# Patient Record
Sex: FEMALE | URBAN - METROPOLITAN AREA
[De-identification: names, ages, dates, MRNs, and addresses within clinical notes are randomized per-mention and may not be internally consistent; named-entity substitution may affect disease eponyms.]

---

## 2017-02-15 ENCOUNTER — OUTPATIENT (OUTPATIENT)
Dept: OUTPATIENT SERVICES | Facility: HOSPITAL | Age: 62
LOS: 1 days | Discharge: HOME | End: 2017-02-15
Payer: MEDICARE

## 2017-02-15 ENCOUNTER — RESULT REVIEW (OUTPATIENT)
Age: 62
End: 2017-02-15

## 2017-02-15 VITALS
OXYGEN SATURATION: 100 % | RESPIRATION RATE: 16 BRPM | SYSTOLIC BLOOD PRESSURE: 114 MMHG | TEMPERATURE: 98 F | HEIGHT: 63 IN | HEART RATE: 76 BPM | DIASTOLIC BLOOD PRESSURE: 76 MMHG | WEIGHT: 138.01 LBS

## 2017-02-15 VITALS
DIASTOLIC BLOOD PRESSURE: 84 MMHG | RESPIRATION RATE: 16 BRPM | OXYGEN SATURATION: 99 % | TEMPERATURE: 99 F | SYSTOLIC BLOOD PRESSURE: 139 MMHG | HEART RATE: 75 BPM

## 2017-02-15 DIAGNOSIS — F41.9 ANXIETY DISORDER, UNSPECIFIED: ICD-10-CM

## 2017-02-15 DIAGNOSIS — D47.2 MONOCLONAL GAMMOPATHY: ICD-10-CM

## 2017-02-15 DIAGNOSIS — R80.9 PROTEINURIA, UNSPECIFIED: ICD-10-CM

## 2017-02-15 DIAGNOSIS — I10 ESSENTIAL (PRIMARY) HYPERTENSION: ICD-10-CM

## 2017-02-15 DIAGNOSIS — Z98.89 OTHER SPECIFIED POSTPROCEDURAL STATES: Chronic | ICD-10-CM

## 2017-02-15 DIAGNOSIS — N99.840 POSTPROCEDURAL HEMATOMA OF A GENITOURINARY SYSTEM ORGAN OR STRUCTURE FOLLOWING A GENITOURINARY SYSTEM PROCEDURE: ICD-10-CM

## 2017-02-15 DIAGNOSIS — Z98.890 OTHER SPECIFIED POSTPROCEDURAL STATES: Chronic | ICD-10-CM

## 2017-02-15 DIAGNOSIS — B74.3 LOIASIS: Chronic | ICD-10-CM

## 2017-02-15 DIAGNOSIS — Z90.49 ACQUIRED ABSENCE OF OTHER SPECIFIED PARTS OF DIGESTIVE TRACT: Chronic | ICD-10-CM

## 2017-02-15 DIAGNOSIS — K63.2 FISTULA OF INTESTINE: Chronic | ICD-10-CM

## 2017-02-15 LAB
INR BLD: 0.93 RATIO — SIGNIFICANT CHANGE UP (ref 0.88–1.16)
PROTHROM AB SERPL-ACNC: 10.2 SEC — SIGNIFICANT CHANGE UP (ref 10–13.1)

## 2017-02-15 PROCEDURE — 88313 SPECIAL STAINS GROUP 2: CPT | Mod: 26

## 2017-02-15 PROCEDURE — 88329 PATH CONSLTJ DRG SURG: CPT

## 2017-02-15 PROCEDURE — 88348 ELECTRON MICROSCOPY DX: CPT | Mod: 26

## 2017-02-15 PROCEDURE — 93010 ELECTROCARDIOGRAM REPORT: CPT

## 2017-02-15 PROCEDURE — 50200 RENAL BIOPSY PERQ: CPT | Mod: LT

## 2017-02-15 PROCEDURE — 77012 CT SCAN FOR NEEDLE BIOPSY: CPT | Mod: 26

## 2017-02-15 PROCEDURE — 88350 IMFLUOR EA ADDL 1ANTB STN PX: CPT | Mod: 26

## 2017-02-15 PROCEDURE — 88305 TISSUE EXAM BY PATHOLOGIST: CPT | Mod: 26

## 2017-02-15 PROCEDURE — 88346 IMFLUOR 1ST 1ANTB STAIN PX: CPT | Mod: 26

## 2017-02-15 RX ORDER — HYDROMORPHONE HYDROCHLORIDE 2 MG/ML
1 INJECTION INTRAMUSCULAR; INTRAVENOUS; SUBCUTANEOUS EVERY 4 HOURS
Qty: 0 | Refills: 0 | Status: DISCONTINUED | OUTPATIENT
Start: 2017-02-15 | End: 2017-02-15

## 2017-02-15 RX ORDER — SODIUM CHLORIDE 9 MG/ML
1000 INJECTION INTRAMUSCULAR; INTRAVENOUS; SUBCUTANEOUS
Qty: 0 | Refills: 0 | Status: DISCONTINUED | OUTPATIENT
Start: 2017-02-15 | End: 2017-02-15

## 2017-02-15 RX ORDER — FENTANYL CITRATE 50 UG/ML
25 INJECTION INTRAVENOUS
Qty: 0 | Refills: 0 | Status: DISCONTINUED | OUTPATIENT
Start: 2017-02-15 | End: 2017-02-15

## 2017-02-15 RX ADMIN — SODIUM CHLORIDE 75 MILLILITER(S): 9 INJECTION INTRAMUSCULAR; INTRAVENOUS; SUBCUTANEOUS at 16:09

## 2017-02-15 RX ADMIN — FENTANYL CITRATE 25 MICROGRAM(S): 50 INJECTION INTRAVENOUS at 16:00

## 2017-02-15 RX ADMIN — FENTANYL CITRATE 25 MICROGRAM(S): 50 INJECTION INTRAVENOUS at 15:30

## 2017-09-15 LAB — SURGICAL PATHOLOGY FINAL REPORT - CH: SIGNIFICANT CHANGE UP

## 2017-10-24 ENCOUNTER — RESULT REVIEW (OUTPATIENT)
Age: 62
End: 2017-10-24

## 2018-11-01 ENCOUNTER — RESULT REVIEW (OUTPATIENT)
Age: 63
End: 2018-11-01

## 2019-05-07 ENCOUNTER — OTHER (OUTPATIENT)
Age: 64
End: 2019-05-07

## 2019-05-07 PROBLEM — D47.2 MONOCLONAL GAMMOPATHY: Chronic | Status: ACTIVE | Noted: 2017-02-15

## 2019-05-07 PROBLEM — K65.9 PERITONITIS, UNSPECIFIED: Chronic | Status: ACTIVE | Noted: 2017-02-15

## 2019-05-07 PROBLEM — I10 ESSENTIAL (PRIMARY) HYPERTENSION: Chronic | Status: ACTIVE | Noted: 2017-02-15

## 2019-05-16 ENCOUNTER — RECORD ABSTRACTING (OUTPATIENT)
Age: 64
End: 2019-05-16

## 2019-05-16 DIAGNOSIS — N95.2 POSTMENOPAUSAL ATROPHIC VAGINITIS: ICD-10-CM

## 2019-05-16 DIAGNOSIS — Z87.898 PERSONAL HISTORY OF OTHER SPECIFIED CONDITIONS: ICD-10-CM

## 2019-05-16 DIAGNOSIS — Z92.89 PERSONAL HISTORY OF OTHER MEDICAL TREATMENT: ICD-10-CM

## 2019-05-16 LAB — CYTOLOGY CVX/VAG DOC THIN PREP: NORMAL

## 2019-05-17 ENCOUNTER — APPOINTMENT (OUTPATIENT)
Dept: OBGYN | Facility: CLINIC | Age: 64
End: 2019-05-17
Payer: MEDICARE

## 2019-05-17 VITALS
WEIGHT: 161 LBS | HEIGHT: 63 IN | BODY MASS INDEX: 28.53 KG/M2 | SYSTOLIC BLOOD PRESSURE: 130 MMHG | DIASTOLIC BLOOD PRESSURE: 80 MMHG

## 2019-05-17 PROCEDURE — 76856 US EXAM PELVIC COMPLETE: CPT | Mod: 59

## 2019-05-17 PROCEDURE — 76830 TRANSVAGINAL US NON-OB: CPT

## 2019-05-17 NOTE — HISTORY OF PRESENT ILLNESS
[Healthy Diet] : a healthy diet [Good] : being in good health [Last Mammogram ___] : Last Mammogram was [unfilled] [Last Pap ___] : Last cervical pap smear was [unfilled] [Last Bone Density ___] : Last bone density studies [unfilled] [Definite:  ___ (Date)] : the last menstrual period was [unfilled] [Menarche Age: ____] : age at menarche was [unfilled] [Sexually Active] : is not sexually active [Contraception] : does not use contraception

## 2019-05-17 NOTE — REVIEW OF SYSTEMS
[Anxiety] : anxiety [Sleep Disturbances] : sleep disturbances [Depression] : depression [Nl] : Integumentary

## 2019-06-20 ENCOUNTER — ASOB RESULT (OUTPATIENT)
Age: 64
End: 2019-06-20

## 2019-06-20 ENCOUNTER — APPOINTMENT (OUTPATIENT)
Dept: OBGYN | Facility: CLINIC | Age: 64
End: 2019-06-20
Payer: MEDICARE

## 2019-06-20 VITALS
BODY MASS INDEX: 28.7 KG/M2 | DIASTOLIC BLOOD PRESSURE: 78 MMHG | WEIGHT: 162 LBS | SYSTOLIC BLOOD PRESSURE: 112 MMHG | HEIGHT: 63 IN

## 2019-06-20 DIAGNOSIS — Z82.49 FAMILY HISTORY OF ISCHEMIC HEART DISEASE AND OTHER DISEASES OF THE CIRCULATORY SYSTEM: ICD-10-CM

## 2019-06-20 DIAGNOSIS — N88.2 STRICTURE AND STENOSIS OF CERVIX UTERI: ICD-10-CM

## 2019-06-20 PROCEDURE — 76856 US EXAM PELVIC COMPLETE: CPT | Mod: 59

## 2019-06-20 PROCEDURE — 76830 TRANSVAGINAL US NON-OB: CPT

## 2019-06-20 PROCEDURE — 99213 OFFICE O/P EST LOW 20 MIN: CPT | Mod: 25

## 2019-06-20 PROCEDURE — 36415 COLL VENOUS BLD VENIPUNCTURE: CPT

## 2019-07-03 PROBLEM — Z82.49 FAMILY HISTORY OF HYPERTENSION: Status: ACTIVE | Noted: 2019-05-16

## 2019-07-03 PROBLEM — N88.2 CERVICAL STENOSIS (UTERINE CERVIX): Status: ACTIVE | Noted: 2019-05-16

## 2019-07-03 LAB
AFP-TM SERPL-MCNC: 7.5 NG/ML
CA 125 (LABCORP): 9.3 U/ML
CANCER AG125 SERPL-ACNC: 10 U/ML
CEA SERPL-MCNC: 1.8 NG/ML
HCG SERPL-MCNC: 5 MIU/ML
HCG-TM SERPL-MCNC: 5 MIU/ML
HE 4: 70.1 PMOL/L
POSTMENOPAUSAL ROMA: 1.15
PREMENOPAUSAL ROMA: 1.49
ROMA COMMENT: NORMAL

## 2019-07-03 NOTE — HISTORY OF PRESENT ILLNESS
[Last Mammogram ___] : Last Mammogram was [unfilled] [Last Bone Density ___] : Last bone density studies [unfilled] [Last Pap ___] : Last cervical pap smear was [unfilled] [Postmenopausal] : is postmenopausal [Definite:  ___ (Date)] : the last menstrual period was [unfilled] [Menarche Age: ____] : age at menarche was [unfilled] [Sexually Active] : is not sexually active

## 2019-07-03 NOTE — PHYSICAL EXAM
[Awake] : awake [Alert] : alert [Acute Distress] : no acute distress [Mass] : no breast mass [Soft] : soft [Nipple Discharge] : no nipple discharge [Axillary LAD] : no axillary lymphadenopathy [Tender] : non tender [Oriented x3] : oriented to person, place, and time [Normal] : cervix [Uterine Adnexae] : were not tender and not enlarged [No Bleeding] : there was no active vaginal bleeding [CTAB] : CTAB [No Tenderness] : no rectal tenderness [RRR, No Murmurs] : RRR, no murmurs

## 2019-11-06 ENCOUNTER — APPOINTMENT (OUTPATIENT)
Dept: OBGYN | Facility: CLINIC | Age: 64
End: 2019-11-06
Payer: MEDICARE

## 2019-11-06 ENCOUNTER — ASOB RESULT (OUTPATIENT)
Age: 64
End: 2019-11-06

## 2019-11-06 PROCEDURE — 76830 TRANSVAGINAL US NON-OB: CPT

## 2019-11-06 PROCEDURE — 76856 US EXAM PELVIC COMPLETE: CPT | Mod: 59

## 2019-11-26 ENCOUNTER — APPOINTMENT (OUTPATIENT)
Dept: OBGYN | Facility: CLINIC | Age: 64
End: 2019-11-26
Payer: MEDICARE

## 2019-11-26 VITALS
SYSTOLIC BLOOD PRESSURE: 116 MMHG | BODY MASS INDEX: 27.29 KG/M2 | DIASTOLIC BLOOD PRESSURE: 74 MMHG | HEIGHT: 63 IN | WEIGHT: 154 LBS

## 2019-11-26 DIAGNOSIS — Z83.3 FAMILY HISTORY OF DIABETES MELLITUS: ICD-10-CM

## 2019-11-26 PROCEDURE — 82270 OCCULT BLOOD FECES: CPT

## 2019-11-26 PROCEDURE — 99397 PER PM REEVAL EST PAT 65+ YR: CPT

## 2019-12-05 LAB
BILIRUB UR QL STRIP: NORMAL
GLUCOSE UR-MCNC: NORMAL
HCG UR QL: 0.2 EU/DL
HGB UR QL STRIP.AUTO: ABNORMAL
KETONES UR-MCNC: NORMAL
LEUKOCYTE ESTERASE UR QL STRIP: ABNORMAL
NITRITE UR QL STRIP: NORMAL
PH UR STRIP: 5.5
PROT UR STRIP-MCNC: ABNORMAL
SP GR UR STRIP: 1

## 2019-12-18 LAB
CYTOLOGY CVX/VAG DOC THIN PREP: ABNORMAL
DATE COLLECTED: NORMAL
HEMOCCULT SP1 STL QL: NEGATIVE
HPV HIGH+LOW RISK DNA PNL CVX: NOT DETECTED
QUALITY CONTROL: YES

## 2020-01-24 ENCOUNTER — OTHER (OUTPATIENT)
Age: 65
End: 2020-01-24

## 2020-04-17 NOTE — END OF VISIT
[FreeTextEntry3] : I, Karla Reyes, acted solely as a scribe for Dr. Neely on this 11/26/19.\par \par All medical record entries made by the Scribe were at Dr. Neely's direction and personally dictated by me on 11/26/19. I have reviewed the chart and agree that the record accurately reflects my personal performance of history, physical exam, assessment and plan. I have also personally directed, reviewed, and agreed with the chart.

## 2020-04-17 NOTE — PHYSICAL EXAM
[Alert] : alert [Awake] : awake [Soft] : soft [Oriented x3] : oriented to person, place, and time [Labia Minora] : labia minora [Normal] : clitoris [Labia Majora] : labia major [No Bleeding] : there was no active vaginal bleeding [Pap Obtained] : a Pap smear was performed [No Tenderness] : no rectal tenderness [Uterine Adnexae] : were not tender and not enlarged [Nl Sphincter Tone] : normal sphincter tone [Acute Distress] : no acute distress [Mass] : no breast mass [Nipple Discharge] : no nipple discharge [Axillary LAD] : no axillary lymphadenopathy [Tender] : non tender [Depressed Mood] : not depressed [Flat Affect] : affect not flat [Occult Blood] : occult blood test from digital rectal exam was negative

## 2020-04-17 NOTE — HISTORY OF PRESENT ILLNESS
[Last Mammogram ___] : Last Mammogram was [unfilled] [Last Bone Density ___] : Last bone density studies [unfilled] [Last Pap ___] : Last cervical pap smear was [unfilled] [Last Colonoscopy ___] : Last colonoscopy [unfilled] [Postmenopausal] : is postmenopausal [Pregnancy History] : pregnancy history: [Total Preg ___] : [unfilled] [Full Term ___] : [unfilled] [Abortions ___] : [unfilled] [Living ___] : [unfilled] [HPV Vaccine NA Due to Age] : HPV vaccine not available to patient due to age [Definite:  ___ (Date)] : the last menstrual period was [unfilled] [Menarche Age: ____] : age at menarche was [unfilled] [Condoms] : uses condoms [Hot Flashes] : no hot flashes [Night Sweats] : no night sweats [Vaginal Itching] : no vaginal itching [Dyspareunia] : no dyspareunia [Mood Changes] : no mood changes [Sexually Active] : is not sexually active [Contraception] : does not use contraception

## 2020-12-01 ENCOUNTER — APPOINTMENT (OUTPATIENT)
Dept: OBGYN | Facility: CLINIC | Age: 65
End: 2020-12-01
Payer: MEDICARE

## 2020-12-01 VITALS
BODY MASS INDEX: 27.64 KG/M2 | WEIGHT: 156 LBS | DIASTOLIC BLOOD PRESSURE: 78 MMHG | TEMPERATURE: 97.3 F | HEIGHT: 63 IN | SYSTOLIC BLOOD PRESSURE: 120 MMHG

## 2020-12-01 PROCEDURE — 99072 ADDL SUPL MATRL&STAF TM PHE: CPT

## 2020-12-01 PROCEDURE — 81003 URINALYSIS AUTO W/O SCOPE: CPT | Mod: QW

## 2020-12-01 PROCEDURE — 82270 OCCULT BLOOD FECES: CPT

## 2020-12-01 PROCEDURE — 99397 PER PM REEVAL EST PAT 65+ YR: CPT

## 2020-12-02 NOTE — END OF VISIT
[FreeTextEntry3] : I, Juan Matt, acted solely as a scribe for Dr. Neely on this date 12/01/2020.\par All medical record entries made by the Scribe were at my, Dr. Neely's direction and personally dictated by me on  12/01/2020. I have reviewed the chart and agree that the record accurately reflects my personal performance of the history, physical exam, assessment and plan. I have also personally directed, reviewed, and agreed with the chart.\par

## 2020-12-02 NOTE — PHYSICAL EXAM
[Appropriately responsive] : appropriately responsive [Alert] : alert [No Acute Distress] : no acute distress [No Lymphadenopathy] : no lymphadenopathy [Soft] : soft [Non-tender] : non-tender [Non-distended] : non-distended [No HSM] : No HSM [No Lesions] : no lesions [No Mass] : no mass [Oriented x3] : oriented x3 [Examination Of The Breasts] : a normal appearance [No Masses] : no breast masses were palpable [Labia Majora] : normal [Labia Minora] : normal [Atrophy] : atrophy [Dry Mucosa] : dry mucosa [Normal] : normal [Uterine Adnexae] : normal [No Tenderness] : no tenderness [Nl Sphincter Tone] : normal sphincter tone [FreeTextEntry9] : Guaiac negative

## 2020-12-02 NOTE — HISTORY OF PRESENT ILLNESS
[Patient reported mammogram was normal] : Patient reported mammogram was normal [Patient reported breast sonogram was normal] : Patient reported breast sonogram was normal [Patient reported PAP Smear was normal] : Patient reported PAP Smear was normal [Patient reported bone density results were abnormal] : Patient reported bone density results were abnormal [LMP unknown] : LMP unknown [N] : Patient is not sexually active [unknown] : Patient is unsure of the date of her LMP [Previously active] : previously active [FreeTextEntry1] : Siobhan is here for her annual exam. She is doing well. She sees oncologist, Dr Miller, for osteoporosis and is currently taking Prolia injections for the treatment. She is stressed and anxious due to COVID -19 pandemic and also due to her  who recently got surgery for prostate cancer. Support was given.\par \par \par  [Mammogramdate] : 09/20/19 [BreastSonogramDate] : 09/20/19 [PapSmeardate] : 11/26/19 [BoneDensityDate] : 09/23/19 [PGHxTotal] : 4 [[facility]] : 3 [PGHxABInduced] : 1

## 2020-12-02 NOTE — DISCUSSION/SUMMARY
[FreeTextEntry1] : During this visit comprehensive counseling was given regarding the following concerns:\par 1 We discussed the need for proper nutrition and exercise.  This includes cardiovascular and pelvic floor exercise.\par \par 2 We discussed the importance of maintaining a proper vaccination schedule and we discussed the utility of the flu vaccine and TDaP.\par \par 3 We discussed the impact of chronic sun exposure and the risk for skin disease as a result. The benefits of a total body scan by a Dermatologist was reviewed.\par \par 4 We discussed the need for certain supplements for most people which include vitamin D3, calcium rich foods with limitation on calcium supplementation by tabular form to 600 mg by mouth daily.  As part of a bone health program we recommend weight bearing exercises, and some limited sun exposure ( 10-15 minutes daily) to help convert vitamin D to its active form.\par \par 5 She states that her  recently was diagnosed with prostate cancer and last month he had surgery for it. She is trying to cope with the stress and anxiety. Support was given. I recommended her to see a therapist for a talk.\par \par 6. Rx for mammogram screening and breast US given.\par \par 7.We discussed the need for screening colonoscopy.\par \par She will follow up annually and as needed.\par \par During this visit 20 minutes were spent face-to-face with greater than 50% of the time dedicated to counseling.\par \par

## 2021-03-24 ENCOUNTER — EMERGENCY (EMERGENCY)
Facility: HOSPITAL | Age: 66
LOS: 0 days | Discharge: HOME | End: 2021-03-24
Attending: EMERGENCY MEDICINE
Payer: MEDICARE

## 2021-03-24 VITALS
RESPIRATION RATE: 15 BRPM | DIASTOLIC BLOOD PRESSURE: 78 MMHG | SYSTOLIC BLOOD PRESSURE: 124 MMHG | OXYGEN SATURATION: 100 % | HEART RATE: 76 BPM

## 2021-03-24 VITALS — HEIGHT: 63 IN | WEIGHT: 134.92 LBS

## 2021-03-24 DIAGNOSIS — K63.2 FISTULA OF INTESTINE: Chronic | ICD-10-CM

## 2021-03-24 DIAGNOSIS — Z98.89 OTHER SPECIFIED POSTPROCEDURAL STATES: Chronic | ICD-10-CM

## 2021-03-24 DIAGNOSIS — Z98.890 OTHER SPECIFIED POSTPROCEDURAL STATES: Chronic | ICD-10-CM

## 2021-03-24 DIAGNOSIS — D47.2 MONOCLONAL GAMMOPATHY: ICD-10-CM

## 2021-03-24 DIAGNOSIS — Z90.49 ACQUIRED ABSENCE OF OTHER SPECIFIED PARTS OF DIGESTIVE TRACT: Chronic | ICD-10-CM

## 2021-03-24 DIAGNOSIS — B74.3 LOIASIS: Chronic | ICD-10-CM

## 2021-03-24 DIAGNOSIS — R07.89 OTHER CHEST PAIN: ICD-10-CM

## 2021-03-24 DIAGNOSIS — C90.00 MULTIPLE MYELOMA NOT HAVING ACHIEVED REMISSION: ICD-10-CM

## 2021-03-24 DIAGNOSIS — Z88.2 ALLERGY STATUS TO SULFONAMIDES: ICD-10-CM

## 2021-03-24 DIAGNOSIS — I10 ESSENTIAL (PRIMARY) HYPERTENSION: ICD-10-CM

## 2021-03-24 LAB
ALBUMIN SERPL ELPH-MCNC: 4.2 G/DL — SIGNIFICANT CHANGE UP (ref 3.3–5)
ALP SERPL-CCNC: 62 U/L — SIGNIFICANT CHANGE UP (ref 40–120)
ALT FLD-CCNC: 28 U/L — SIGNIFICANT CHANGE UP (ref 12–78)
ANION GAP SERPL CALC-SCNC: 4 MMOL/L — LOW (ref 5–17)
AST SERPL-CCNC: 19 U/L — SIGNIFICANT CHANGE UP (ref 15–37)
BASOPHILS # BLD AUTO: 0.04 K/UL — SIGNIFICANT CHANGE UP (ref 0–0.2)
BASOPHILS NFR BLD AUTO: 0.6 % — SIGNIFICANT CHANGE UP (ref 0–2)
BILIRUB SERPL-MCNC: 0.9 MG/DL — SIGNIFICANT CHANGE UP (ref 0.2–1.2)
BUN SERPL-MCNC: 8 MG/DL — SIGNIFICANT CHANGE UP (ref 7–23)
CALCIUM SERPL-MCNC: 9.3 MG/DL — SIGNIFICANT CHANGE UP (ref 8.5–10.1)
CHLORIDE SERPL-SCNC: 108 MMOL/L — SIGNIFICANT CHANGE UP (ref 96–108)
CO2 SERPL-SCNC: 30 MMOL/L — SIGNIFICANT CHANGE UP (ref 22–31)
CREAT SERPL-MCNC: 1.18 MG/DL — SIGNIFICANT CHANGE UP (ref 0.5–1.3)
EOSINOPHIL # BLD AUTO: 0.05 K/UL — SIGNIFICANT CHANGE UP (ref 0–0.5)
EOSINOPHIL NFR BLD AUTO: 0.8 % — SIGNIFICANT CHANGE UP (ref 0–6)
GLUCOSE SERPL-MCNC: 94 MG/DL — SIGNIFICANT CHANGE UP (ref 70–99)
HCT VFR BLD CALC: 45 % — SIGNIFICANT CHANGE UP (ref 34.5–45)
HGB BLD-MCNC: 14.7 G/DL — SIGNIFICANT CHANGE UP (ref 11.5–15.5)
IMM GRANULOCYTES NFR BLD AUTO: 0.2 % — SIGNIFICANT CHANGE UP (ref 0–1.5)
LYMPHOCYTES # BLD AUTO: 1.61 K/UL — SIGNIFICANT CHANGE UP (ref 1–3.3)
LYMPHOCYTES # BLD AUTO: 25.3 % — SIGNIFICANT CHANGE UP (ref 13–44)
MCHC RBC-ENTMCNC: 32.1 PG — SIGNIFICANT CHANGE UP (ref 27–34)
MCHC RBC-ENTMCNC: 32.7 GM/DL — SIGNIFICANT CHANGE UP (ref 32–36)
MCV RBC AUTO: 98.3 FL — SIGNIFICANT CHANGE UP (ref 80–100)
MONOCYTES # BLD AUTO: 0.44 K/UL — SIGNIFICANT CHANGE UP (ref 0–0.9)
MONOCYTES NFR BLD AUTO: 6.9 % — SIGNIFICANT CHANGE UP (ref 2–14)
NEUTROPHILS # BLD AUTO: 4.21 K/UL — SIGNIFICANT CHANGE UP (ref 1.8–7.4)
NEUTROPHILS NFR BLD AUTO: 66.2 % — SIGNIFICANT CHANGE UP (ref 43–77)
PLATELET # BLD AUTO: 245 K/UL — SIGNIFICANT CHANGE UP (ref 150–400)
POTASSIUM SERPL-MCNC: 4.1 MMOL/L — SIGNIFICANT CHANGE UP (ref 3.5–5.3)
POTASSIUM SERPL-SCNC: 4.1 MMOL/L — SIGNIFICANT CHANGE UP (ref 3.5–5.3)
PROT SERPL-MCNC: 7.7 GM/DL — SIGNIFICANT CHANGE UP (ref 6–8.3)
RBC # BLD: 4.58 M/UL — SIGNIFICANT CHANGE UP (ref 3.8–5.2)
RBC # FLD: 11.6 % — SIGNIFICANT CHANGE UP (ref 10.3–14.5)
SODIUM SERPL-SCNC: 142 MMOL/L — SIGNIFICANT CHANGE UP (ref 135–145)
TROPONIN I SERPL-MCNC: <0.015 NG/ML — SIGNIFICANT CHANGE UP (ref 0.01–0.04)
WBC # BLD: 6.36 K/UL — SIGNIFICANT CHANGE UP (ref 3.8–10.5)
WBC # FLD AUTO: 6.36 K/UL — SIGNIFICANT CHANGE UP (ref 3.8–10.5)

## 2021-03-24 PROCEDURE — 85025 COMPLETE CBC W/AUTO DIFF WBC: CPT

## 2021-03-24 PROCEDURE — 71046 X-RAY EXAM CHEST 2 VIEWS: CPT | Mod: 26

## 2021-03-24 PROCEDURE — 80053 COMPREHEN METABOLIC PANEL: CPT

## 2021-03-24 PROCEDURE — 99284 EMERGENCY DEPT VISIT MOD MDM: CPT | Mod: 25

## 2021-03-24 PROCEDURE — 99285 EMERGENCY DEPT VISIT HI MDM: CPT

## 2021-03-24 PROCEDURE — 84484 ASSAY OF TROPONIN QUANT: CPT

## 2021-03-24 PROCEDURE — 36415 COLL VENOUS BLD VENIPUNCTURE: CPT

## 2021-03-24 PROCEDURE — 93005 ELECTROCARDIOGRAM TRACING: CPT

## 2021-03-24 PROCEDURE — 71046 X-RAY EXAM CHEST 2 VIEWS: CPT

## 2021-03-24 PROCEDURE — 93010 ELECTROCARDIOGRAM REPORT: CPT

## 2021-03-24 NOTE — ED STATDOCS - PATIENT PORTAL LINK FT
You can access the FollowMyHealth Patient Portal offered by [facility] by registering at the following website: [email or web address]. By joining [employer]’s FollowMyHealth portal, you will also be able to view your health information using other applications (apps) compatible with our system.

## 2021-03-24 NOTE — ED ADULT TRIAGE NOTE - CHIEF COMPLAINT QUOTE
Pt arrives to ED complaining of high blood pressure, chest tightness for two days. Hx HTN, melanoma.

## 2021-03-24 NOTE — ED STATDOCS - CLINICAL SUMMARY MEDICAL DECISION MAKING FREE TEXT BOX
check cardiac enzymes, EKG non ischemic, dispo pending imaging. check cardiac enzymes, EKG non ischemic, dispo pending imaging.      CXR unremarkable.  Labs unremarkable.  Neg. Troponin.  BP unproved at DC.  Pt. feeling well.  Pt. states she does not want a referral to cardiology as she needs a referral from her PMD.  Pt. to follow for ECHO and Stress.  REturn precautions provided to patient.  Labs discussed.  Nisha Myers PA-C check cardiac enzyme, EKG non ischemic, dispo pending imaging.      CXR unremarkable.  Labs unremarkable.  Neg. Troponin.  BP unproved at DC.  Pt. feeling well.  Pt. states she does not want a referral to cardiology as she needs a referral from her PMD.  Pt. to follow for ECHO and Stress.  REturn precautions provided to patient.  Labs discussed.  Nisha Myers PA-C

## 2021-03-24 NOTE — ED STATDOCS - OBJECTIVE STATEMENT
65 y/o female with a PMHx of HTN, multiple myeloma, presents to the ED c/o chest pressure x 1 week with associated mild SOB, dry cough and fatigue Pt has been taking her BP at home and states it has been high for 1 week. States yesterday BP was 188/108. States she has been taking her BP medication (Benazepril 40mg). No burning urination, but reports urinary frequency. Normal stress test 1 year ago. No hx of blood clots.   PMD & Nephrologist: Dr. Adam  Oncologist: Dr. Miller.

## 2021-03-24 NOTE — ED STATDOCS - PROGRESS NOTE DETAILS
Pt. is a 66 year old female presenting with elevated BP, fatigue, SOB x 1 week.  Hx Multiple Myeloma, HTN,  Pt. states "pressure to chest".  Pt last stress years ago.   Pt. evaluated at [facility] for CP last year and DC from ED.  Pt. did not follow with cardiology.   Pt. medications for HTN prescribed by Dr. Bassett, renal.  Pt. taking Klonopin for anxiety.   Pain non radiating to back or arms.  Pt. appearing mildly anxious.    EKG unremarkable.  Plan for labs, one Troponin, CXR, reassess.  Nisha Myers PA-C CXR unremarkable.  Labs unremarkable.  Neg. Troponin.  BP unproved at DC.  Pt. feeling well.  Pt. states she does not want a referral to cardiology as she needs a referral from her PMD.  Pt. to follow for ECHO and Stress.  REturn precautions provided to patient.  Labs discussed.  Nisha Myers PA-C Story not suspicious for acs.  Low risk HEART score.  EKG unremarkable.  Trop x1 negative - sufficient for eval given  timing  of symptoms.  Story not c/w PE - satting well with normal HR and no preceding risk factors - below threshold for further w/u.  No e/o ptx/pna/carditis.  Story not c/w dissection - below threshold for further w/u.  Stable for outpatient f/u.  D/c home with strict return precautions and prompt outpatient f/u.

## 2021-03-24 NOTE — ED STATDOCS - PSH
Abdominal fistula     delivery delivered  X3...., ,   H/O dilation and curettage    H/O ovarian cystectomy  each side, left removed, right 3/4 removed  H/O ovarian cystectomy    H/O:   ,,  History of colon surgery    [place]    S/P colon resection    S/P small bowel resection

## 2021-03-24 NOTE — ED STATDOCS - CARE PROVIDER_API CALL
Lokesh Mckeon (MD)  Cardiovascular Disease  [address], [address]  [place] [address]  Phone: [phone]  Fax: [phone]  Follow Up Time:

## 2021-03-24 NOTE — ED ADULT NURSE NOTE - NSIMPLEMENTINTERV_GEN_ALL_ED
Implemented All Universal Safety Interventions:  [place] to call system. Call bell, personal items and telephone within reach. Instruct patient to call for assistance. Room bathroom lighting operational. Non-slip footwear when patient is off stretcher. Physically safe environment: no spills, clutter or unnecessary equipment. Stretcher in lowest position, wheels locked, appropriate side rails in place.

## 2021-03-25 ENCOUNTER — APPOINTMENT (OUTPATIENT)
Dept: CARDIOLOGY | Facility: CLINIC | Age: 66
End: 2021-03-25
Payer: MEDICARE

## 2021-03-25 ENCOUNTER — NON-APPOINTMENT (OUTPATIENT)
Age: 66
End: 2021-03-25

## 2021-03-25 VITALS
DIASTOLIC BLOOD PRESSURE: 84 MMHG | OXYGEN SATURATION: 99 % | HEIGHT: 63 IN | HEART RATE: 87 BPM | SYSTOLIC BLOOD PRESSURE: 138 MMHG | WEIGHT: 157 LBS | TEMPERATURE: 97.6 F | BODY MASS INDEX: 27.82 KG/M2

## 2021-03-25 PROCEDURE — 99205 OFFICE O/P NEW HI 60 MIN: CPT

## 2021-03-25 PROCEDURE — 99072 ADDL SUPL MATRL&STAF TM PHE: CPT

## 2021-03-25 PROCEDURE — 93000 ELECTROCARDIOGRAM COMPLETE: CPT

## 2021-03-25 RX ORDER — ZOLPIDEM TARTRATE 10 MG/1
10 TABLET, FILM COATED ORAL
Refills: 0 | Status: ACTIVE | COMMUNITY

## 2021-03-25 RX ORDER — DENOSUMAB 60 MG/ML
60 INJECTION SUBCUTANEOUS
Refills: 0 | Status: ACTIVE | COMMUNITY

## 2021-03-25 RX ORDER — IBANDRONATE SODIUM 150 MG/1
150 TABLET, FILM COATED ORAL
Refills: 0 | Status: DISCONTINUED | COMMUNITY
End: 2021-03-25

## 2021-03-25 NOTE — PHYSICAL EXAM
[General Appearance - Well Developed] : well developed [Normal Appearance] : normal appearance [Well Groomed] : well groomed [General Appearance - Well Nourished] : well nourished [No Deformities] : no deformities [General Appearance - In No Acute Distress] : no acute distress [Normal Conjunctiva] : the conjunctiva exhibited no abnormalities [Eyelids - No Xanthelasma] : the eyelids demonstrated no xanthelasmas [Normal Oral Mucosa] : normal oral mucosa [No Oral Pallor] : no oral pallor [No Oral Cyanosis] : no oral cyanosis [Normal Jugular Venous A Waves Present] : normal jugular venous A waves present [Normal Jugular Venous V Waves Present] : normal jugular venous V waves present [No Jugular Venous Patino A Waves] : no jugular venous patino A waves [Heart Rate And Rhythm] : heart rate and rhythm were normal [Heart Sounds] : normal S1 and S2 [Murmurs] : no murmurs present [Respiration, Rhythm And Depth] : normal respiratory rhythm and effort [Exaggerated Use Of Accessory Muscles For Inspiration] : no accessory muscle use [Auscultation Breath Sounds / Voice Sounds] : lungs were clear to auscultation bilaterally [Abdomen Soft] : soft [Abdomen Tenderness] : non-tender [Abdomen Mass (___ Cm)] : no abdominal mass palpated [Abnormal Walk] : normal gait [Gait - Sufficient For Exercise Testing] : the gait was sufficient for exercise testing [Nail Clubbing] : no clubbing of the fingernails [Cyanosis, Localized] : no localized cyanosis [Petechial Hemorrhages (___cm)] : no petechial hemorrhages [Skin Color & Pigmentation] : normal skin color and pigmentation [] : no rash [No Venous Stasis] : no venous stasis [Skin Lesions] : no skin lesions [No Skin Ulcers] : no skin ulcer [No Xanthoma] : no  xanthoma was observed [Oriented To Time, Place, And Person] : oriented to person, place, and time [Affect] : the affect was normal [Mood] : the mood was normal [No Anxiety] : not feeling anxious

## 2021-03-25 NOTE — HISTORY OF PRESENT ILLNESS
[FreeTextEntry1] : 65 yo female presents for evaluation for hypertension. Pt was seen in ED for complaints of headache, chest pressure and elevated blood pressure. Pt has been followed for CKD and MM at [facility]. Pt was discharged and associates her chest symptoms with elevated blood pressure. Pt had a previous evaluation at [facility] for elevated blood pressure. Pt does not exercise. No smoking. Pt reports palpitations and shortness of breath with exertion.

## 2021-03-25 NOTE — DISCUSSION/SUMMARY
[FreeTextEntry1] : Pt will have an Echo, ETT and Zio to evaluate chest pain, shortness of breath. Pt will follow up in 2 mo.

## 2021-04-14 ENCOUNTER — APPOINTMENT (OUTPATIENT)
Dept: CARDIOLOGY | Facility: CLINIC | Age: 66
End: 2021-04-14
Payer: MEDICARE

## 2021-04-14 PROCEDURE — 99072 ADDL SUPL MATRL&STAF TM PHE: CPT

## 2021-04-14 PROCEDURE — 93306 TTE W/DOPPLER COMPLETE: CPT

## 2021-04-23 ENCOUNTER — APPOINTMENT (OUTPATIENT)
Dept: CARDIOLOGY | Facility: CLINIC | Age: 66
End: 2021-04-23
Payer: MEDICARE

## 2021-04-23 PROCEDURE — 93015 CV STRESS TEST SUPVJ I&R: CPT

## 2021-04-23 PROCEDURE — 99072 ADDL SUPL MATRL&STAF TM PHE: CPT

## 2021-05-27 ENCOUNTER — NON-APPOINTMENT (OUTPATIENT)
Age: 66
End: 2021-05-27

## 2021-05-27 ENCOUNTER — APPOINTMENT (OUTPATIENT)
Dept: CARDIOLOGY | Facility: CLINIC | Age: 66
End: 2021-05-27
Payer: MEDICARE

## 2021-05-27 VITALS
WEIGHT: 151 LBS | TEMPERATURE: 98.6 F | HEIGHT: 63 IN | DIASTOLIC BLOOD PRESSURE: 76 MMHG | BODY MASS INDEX: 26.75 KG/M2 | HEART RATE: 68 BPM | OXYGEN SATURATION: 100 % | SYSTOLIC BLOOD PRESSURE: 128 MMHG

## 2021-05-27 PROCEDURE — 93000 ELECTROCARDIOGRAM COMPLETE: CPT

## 2021-05-27 PROCEDURE — 99214 OFFICE O/P EST MOD 30 MIN: CPT

## 2021-05-27 RX ORDER — AMLODIPINE BESYLATE 5 MG/1
5 TABLET ORAL DAILY
Refills: 0 | Status: DISCONTINUED | COMMUNITY
End: 2021-05-27

## 2021-05-27 NOTE — DISCUSSION/SUMMARY
[FreeTextEntry1] : Labs and testing were reviewed. Suggested sleep referral for cessation of Ambien. Discontinue amlodipine start metoprolol 25 mg. Follow up in 3 months.

## 2021-05-27 NOTE — REASON FOR VISIT
[Symptom and Test Evaluation] : symptom and test evaluation [Arrhythmia/ECG Abnorrmalities] : arrhythmia/ECG abnormalities [Initial Evaluation] : an initial evaluation of [Hypertension] : hypertension

## 2021-05-27 NOTE — HISTORY OF PRESENT ILLNESS
[FreeTextEntry1] : 65 yo female presents for evaluation for hypertension. Testing was performed. Pt denies chest pain or shortness of breath. She denies chest pain or shortness of breath. Zio revealed brief episodes of SVT with possible brief episodes of atrial fib. .

## 2021-08-20 ENCOUNTER — APPOINTMENT (OUTPATIENT)
Dept: CARDIOLOGY | Facility: CLINIC | Age: 66
End: 2021-08-20
Payer: MEDICARE

## 2021-08-20 ENCOUNTER — NON-APPOINTMENT (OUTPATIENT)
Age: 66
End: 2021-08-20

## 2021-08-20 VITALS
WEIGHT: 144 LBS | TEMPERATURE: 98 F | BODY MASS INDEX: 25.52 KG/M2 | HEIGHT: 63 IN | HEART RATE: 65 BPM | DIASTOLIC BLOOD PRESSURE: 60 MMHG | OXYGEN SATURATION: 99 % | SYSTOLIC BLOOD PRESSURE: 94 MMHG

## 2021-08-20 PROCEDURE — 93000 ELECTROCARDIOGRAM COMPLETE: CPT

## 2021-08-20 PROCEDURE — 99214 OFFICE O/P EST MOD 30 MIN: CPT

## 2021-08-20 NOTE — HISTORY OF PRESENT ILLNESS
[FreeTextEntry1] : 67 yo female presents for evaluation for hypertension. Testing was performed. Pt denies chest pain or shortness of breath. Pt reports that her blood pressure is variable and affected by emotional stress. Pt is concerned about her family.  is present.

## 2021-09-10 ENCOUNTER — APPOINTMENT (OUTPATIENT)
Dept: CARDIOLOGY | Facility: CLINIC | Age: 66
End: 2021-09-10
Payer: MEDICARE

## 2021-09-10 VITALS
OXYGEN SATURATION: 98 % | BODY MASS INDEX: 26.05 KG/M2 | HEART RATE: 82 BPM | SYSTOLIC BLOOD PRESSURE: 110 MMHG | WEIGHT: 147 LBS | HEIGHT: 63 IN | DIASTOLIC BLOOD PRESSURE: 70 MMHG

## 2021-09-10 PROCEDURE — 99214 OFFICE O/P EST MOD 30 MIN: CPT

## 2021-12-13 ENCOUNTER — APPOINTMENT (OUTPATIENT)
Dept: OBGYN | Facility: CLINIC | Age: 66
End: 2021-12-13
Payer: MEDICARE

## 2021-12-13 DIAGNOSIS — Z12.12 ENCOUNTER FOR SCREENING FOR MALIGNANT NEOPLASM OF RECTUM: ICD-10-CM

## 2021-12-13 DIAGNOSIS — Z12.4 ENCOUNTER FOR SCREENING FOR MALIGNANT NEOPLASM OF CERVIX: ICD-10-CM

## 2021-12-13 DIAGNOSIS — N60.19 DIFFUSE CYSTIC MASTOPATHY OF UNSPECIFIED BREAST: ICD-10-CM

## 2021-12-13 DIAGNOSIS — M81.0 AGE-RELATED OSTEOPOROSIS W/OUT CURRENT PATHOLOGICAL FRACTURE: ICD-10-CM

## 2021-12-13 PROCEDURE — 82270 OCCULT BLOOD FECES: CPT

## 2021-12-13 PROCEDURE — 99397 PER PM REEVAL EST PAT 65+ YR: CPT

## 2021-12-14 LAB
DATE COLLECTED: NORMAL
HEMOCCULT SP1 STL QL: NEGATIVE
HPV HIGH+LOW RISK DNA PNL CVX: NOT DETECTED
QUALITY CONTROL: YES

## 2021-12-14 NOTE — HISTORY OF PRESENT ILLNESS
[N] : Patient is not sexually active [Y] : Positive pregnancy history [Menarche Age: ____] : age at menarche was [unfilled] [No] : Patient does not have concerns regarding sex [Previously active] : previously active [Men] : men [Vaginal] : vaginal [TextBox_4] : Annual [Mammogramdate] : 9/22/21 [TextBox_19] : BR 1 [PapSmeardate] : 12/1/20 [TextBox_31] : negative [BoneDensityDate] : 12/1/20 [TextBox_37] : osteoporosis [ColonoscopyDate] : 8/2021 [LMPDate] : 2001 [PGHxTotal] : 4 [[facility]Term] : 3 [[facility]iving] : 3 [TextBox_6] : 2001 [TextBox_9] : 12 [FreeTextEntry1] : 2001

## 2021-12-14 NOTE — PHYSICAL EXAM
[Appropriately responsive] : appropriately responsive [Alert] : alert [No Acute Distress] : no acute distress [No Lymphadenopathy] : no lymphadenopathy [Regular Rate Rhythm] : regular rate rhythm [No Murmurs] : no murmurs [Clear to Auscultation B/L] : clear to auscultation bilaterally [Soft] : soft [Non-tender] : non-tender [Non-distended] : non-distended [No HSM] : No HSM [No Lesions] : no lesions [No Mass] : no mass [Oriented x3] : oriented x3 [Examination Of The Breasts] : a normal appearance [No Discharge] : no discharge [No Masses] : no breast masses were palpable [Labia Majora] : normal [Labia Minora] : normal [No Bleeding] : There was no active vaginal bleeding [Normal] : normal [Uterine Adnexae] : normal [FreeTextEntry1] : Karena [FreeTextEntry9] : Stool for occult blood.

## 2021-12-14 NOTE — DISCUSSION/SUMMARY
[FreeTextEntry1] : During this visit comprehensive counseling was given regarding the following concerns:\par 1 We discussed the need for proper nutrition and exercise. This includes cardiovascular and\par pelvic floor exercise.\par 2 We discussed the importance of maintaining a proper vaccination schedule and we discussed\par the utility of the flu vaccine and TDaP.\par 3 We discussed the impact of chronic sun exposure and the risk for skin disease as a result. The\par benefits of a total body scan by a Dermatologist was reviewed..\par 4 We discussed the need for certain supplements for most people which include vitamin D3,\par calcium rich foods with limitation on calcium supplementation by tabular form to 600 mg by\par mouth daily. As part of a bone health program we recommend weight bearing exercises, and\par some limited sun exposure (10-15 minutes daily) to help convert vitamin D to its active form.\par 5 We discussed the benefit of pelvic floor exercises. She was instructed on the proper technique for\par contraction of the pelvic floor muscles and she was encouraged to perform this 20-30 times daily\par as part of a general exercise program.\par 6 Normal vaginal and rectal exam today. Pap smear collected today.\par 7 Prescription for mammogram screening given.\par \par During this visit 20 minutes were spent face-to-face with greater than 50% of the time dedicated\par to counseling.

## 2021-12-14 NOTE — REVIEW OF SYSTEMS
[Urgency] : urgency [Incontinence] : incontinence [Anxiety] : anxiety [Sleep Disturbances] : sleep disturbances [FreeTextEntry9] : joint pain [de-identified] : ear ache

## 2021-12-14 NOTE — END OF VISIT
[FreeTextEntry3] : I, [Peter Bach] solely acted as scribe for Dr. Diallo Neely on 12/13/2021.\par All medical entries made by the Scribe were at my, Dr. Neely’s, direction and personally\par dictated by me on 12/13/2021.  I have reviewed the chart and agree that the record\par accurately reflects my personal performance of the history, physical exam, assessment and plan. I\par have also personally directed, reviewed, and agreed with the chart.

## 2021-12-21 LAB — CYTOLOGY CVX/VAG DOC THIN PREP: ABNORMAL

## 2022-01-21 ENCOUNTER — APPOINTMENT (OUTPATIENT)
Dept: CARDIOLOGY | Facility: CLINIC | Age: 67
End: 2022-01-21
Payer: MEDICARE

## 2022-01-21 ENCOUNTER — NON-APPOINTMENT (OUTPATIENT)
Age: 67
End: 2022-01-21

## 2022-01-21 VITALS
BODY MASS INDEX: 27.29 KG/M2 | WEIGHT: 154 LBS | HEIGHT: 63 IN | DIASTOLIC BLOOD PRESSURE: 70 MMHG | OXYGEN SATURATION: 100 % | HEART RATE: 58 BPM | SYSTOLIC BLOOD PRESSURE: 120 MMHG

## 2022-01-21 PROCEDURE — 99214 OFFICE O/P EST MOD 30 MIN: CPT

## 2022-01-21 PROCEDURE — 93000 ELECTROCARDIOGRAM COMPLETE: CPT

## 2022-01-21 RX ORDER — BENAZEPRIL HYDROCHLORIDE 40 MG/1
40 TABLET, FILM COATED ORAL DAILY
Refills: 0 | Status: DISCONTINUED | COMMUNITY
End: 2022-01-21

## 2022-01-21 NOTE — HISTORY OF PRESENT ILLNESS
[FreeTextEntry1] : 66 yo female presents for evaluation for hypertension. Testing was performed. Pt denies chest pain or shortness of breath. Pt reports that her blood pressure is variable and affected by emotional stress sometimes.. She reports that her blood pressure is elevated late afternoon.

## 2022-01-21 NOTE — DISCUSSION/SUMMARY
[FreeTextEntry1] : Pt will stop benazepril and start olmesartan 40 mg. She will increase her metoprolol to 50 mg. We will see her in 1 month.

## 2022-02-24 ENCOUNTER — NON-APPOINTMENT (OUTPATIENT)
Age: 67
End: 2022-02-24

## 2022-02-24 ENCOUNTER — APPOINTMENT (OUTPATIENT)
Dept: CARDIOLOGY | Facility: CLINIC | Age: 67
End: 2022-02-24
Payer: MEDICARE

## 2022-02-24 VITALS
BODY MASS INDEX: 28.53 KG/M2 | HEART RATE: 68 BPM | SYSTOLIC BLOOD PRESSURE: 102 MMHG | OXYGEN SATURATION: 100 % | DIASTOLIC BLOOD PRESSURE: 72 MMHG | WEIGHT: 161 LBS | HEIGHT: 63 IN

## 2022-02-24 PROCEDURE — 93000 ELECTROCARDIOGRAM COMPLETE: CPT

## 2022-02-24 PROCEDURE — 99214 OFFICE O/P EST MOD 30 MIN: CPT

## 2022-02-24 NOTE — DISCUSSION/SUMMARY
[FreeTextEntry1] : Pt will continue meds as prescribed and taken to reduce peaks and valleys of blood pressure. Pt will continue watch salt in diet. ECG to evaluate for ischemia. Pt will follow up in 4 months.

## 2022-02-24 NOTE — HISTORY OF PRESENT ILLNESS
[FreeTextEntry1] : 66 yo female presents for evaluation for hypertension. Testing was performed. Pt denies chest pain or shortness of breath. Pt reports that her blood pressure is variable and affected by emotional stress sometimes.. She has an emotional stress. She will continue her current regimen.

## 2022-02-24 NOTE — PHYSICAL EXAM
[General Appearance - Well Developed] : well developed [Normal Appearance] : normal appearance [Well Groomed] : well groomed [General Appearance - Well Nourished] : well nourished [No Deformities] : no deformities [General Appearance - In No Acute Distress] : no acute distress [Normal Conjunctiva] : the conjunctiva exhibited no abnormalities [Eyelids - No Xanthelasma] : the eyelids demonstrated no xanthelasmas [Normal Oral Mucosa] : normal oral mucosa [No Oral Pallor] : no oral pallor [No Oral Cyanosis] : no oral cyanosis [Normal Jugular Venous A Waves Present] : normal jugular venous A waves present [Normal Jugular Venous V Waves Present] : normal jugular venous V waves present [No Jugular Venous Patino A Waves] : no jugular venous patino A waves [Heart Rate And Rhythm] : heart rate and rhythm were normal [Heart Sounds] : normal S1 and S2 [Murmurs] : no murmurs present [Respiration, Rhythm And Depth] : normal respiratory rhythm and effort [Exaggerated Use Of Accessory Muscles For Inspiration] : no accessory muscle use [Auscultation Breath Sounds / Voice Sounds] : lungs were clear to auscultation bilaterally [Abdomen Soft] : soft [Abdomen Tenderness] : non-tender [Abdomen Mass (___ Cm)] : no abdominal mass palpated [Abnormal Walk] : normal gait [Gait - Sufficient For Exercise Testing] : the gait was sufficient for exercise testing [Nail Clubbing] : no clubbing of the fingernails [Cyanosis, Localized] : no localized cyanosis [Petechial Hemorrhages (___cm)] : no petechial hemorrhages [Skin Color & Pigmentation] : normal skin color and pigmentation [] : no rash [No Venous Stasis] : no venous stasis [Skin Lesions] : no skin lesions [No Skin Ulcers] : no skin ulcer [No Xanthoma] : no  xanthoma was observed [Oriented To Time, Place, And Person] : oriented to person, place, and time [Affect] : the affect was normal [No Anxiety] : not feeling anxious [Mood] : the mood was normal

## 2022-03-12 ENCOUNTER — INPATIENT (INPATIENT)
Facility: HOSPITAL | Age: 67
LOS: 1 days | Discharge: HOME | DRG: 305 | End: 2022-03-14
Attending: FAMILY MEDICINE | Admitting: INTERNAL MEDICINE
Payer: MEDICARE

## 2022-03-12 VITALS — WEIGHT: 151.9 LBS | HEIGHT: 63 IN

## 2022-03-12 DIAGNOSIS — I10 ESSENTIAL (PRIMARY) HYPERTENSION: ICD-10-CM

## 2022-03-12 DIAGNOSIS — I16.1 HYPERTENSIVE EMERGENCY: ICD-10-CM

## 2022-03-12 DIAGNOSIS — Z98.89 OTHER SPECIFIED POSTPROCEDURAL STATES: Chronic | ICD-10-CM

## 2022-03-12 DIAGNOSIS — B74.3 LOIASIS: Chronic | ICD-10-CM

## 2022-03-12 DIAGNOSIS — Z90.49 ACQUIRED ABSENCE OF OTHER SPECIFIED PARTS OF DIGESTIVE TRACT: Chronic | ICD-10-CM

## 2022-03-12 DIAGNOSIS — G45.9 TRANSIENT CEREBRAL ISCHEMIC ATTACK, UNSPECIFIED: ICD-10-CM

## 2022-03-12 DIAGNOSIS — K63.2 FISTULA OF INTESTINE: Chronic | ICD-10-CM

## 2022-03-12 DIAGNOSIS — Z98.890 OTHER SPECIFIED POSTPROCEDURAL STATES: Chronic | ICD-10-CM

## 2022-03-12 LAB
ALBUMIN SERPL ELPH-MCNC: 3.8 G/DL — SIGNIFICANT CHANGE UP (ref 3.3–5)
ALP SERPL-CCNC: 60 U/L — SIGNIFICANT CHANGE UP (ref 40–120)
ALT FLD-CCNC: 38 U/L — SIGNIFICANT CHANGE UP (ref 12–78)
ANION GAP SERPL CALC-SCNC: 5 MMOL/L — SIGNIFICANT CHANGE UP (ref 5–17)
AST SERPL-CCNC: 21 U/L — SIGNIFICANT CHANGE UP (ref 15–37)
BASOPHILS # BLD AUTO: 0.04 K/UL — SIGNIFICANT CHANGE UP (ref 0–0.2)
BASOPHILS NFR BLD AUTO: 0.4 % — SIGNIFICANT CHANGE UP (ref 0–2)
BILIRUB SERPL-MCNC: 0.9 MG/DL — SIGNIFICANT CHANGE UP (ref 0.2–1.2)
BUN SERPL-MCNC: 15 MG/DL — SIGNIFICANT CHANGE UP (ref 7–23)
CALCIUM SERPL-MCNC: 9 MG/DL — SIGNIFICANT CHANGE UP (ref 8.5–10.1)
CHLORIDE SERPL-SCNC: 109 MMOL/L — HIGH (ref 96–108)
CO2 SERPL-SCNC: 26 MMOL/L — SIGNIFICANT CHANGE UP (ref 22–31)
CREAT SERPL-MCNC: 1.02 MG/DL — SIGNIFICANT CHANGE UP (ref 0.5–1.3)
EGFR: 60 ML/MIN/1.73M2 — SIGNIFICANT CHANGE UP
EOSINOPHIL # BLD AUTO: 0.04 K/UL — SIGNIFICANT CHANGE UP (ref 0–0.5)
EOSINOPHIL NFR BLD AUTO: 0.4 % — SIGNIFICANT CHANGE UP (ref 0–6)
GLUCOSE SERPL-MCNC: 117 MG/DL — HIGH (ref 70–99)
HCT VFR BLD CALC: 42.9 % — SIGNIFICANT CHANGE UP (ref 34.5–45)
HGB BLD-MCNC: 14.3 G/DL — SIGNIFICANT CHANGE UP (ref 11.5–15.5)
IMM GRANULOCYTES NFR BLD AUTO: 0.2 % — SIGNIFICANT CHANGE UP (ref 0–1.5)
LYMPHOCYTES # BLD AUTO: 1.37 K/UL — SIGNIFICANT CHANGE UP (ref 1–3.3)
LYMPHOCYTES # BLD AUTO: 14.3 % — SIGNIFICANT CHANGE UP (ref 13–44)
MAGNESIUM SERPL-MCNC: 2.4 MG/DL — SIGNIFICANT CHANGE UP (ref 1.6–2.6)
MCHC RBC-ENTMCNC: 32.5 PG — SIGNIFICANT CHANGE UP (ref 27–34)
MCHC RBC-ENTMCNC: 33.3 GM/DL — SIGNIFICANT CHANGE UP (ref 32–36)
MCV RBC AUTO: 97.5 FL — SIGNIFICANT CHANGE UP (ref 80–100)
MONOCYTES # BLD AUTO: 0.58 K/UL — SIGNIFICANT CHANGE UP (ref 0–0.9)
MONOCYTES NFR BLD AUTO: 6 % — SIGNIFICANT CHANGE UP (ref 2–14)
NEUTROPHILS # BLD AUTO: 7.54 K/UL — HIGH (ref 1.8–7.4)
NEUTROPHILS NFR BLD AUTO: 78.7 % — HIGH (ref 43–77)
PLATELET # BLD AUTO: 261 K/UL — SIGNIFICANT CHANGE UP (ref 150–400)
POTASSIUM SERPL-MCNC: 3.7 MMOL/L — SIGNIFICANT CHANGE UP (ref 3.5–5.3)
POTASSIUM SERPL-SCNC: 3.7 MMOL/L — SIGNIFICANT CHANGE UP (ref 3.5–5.3)
PROT SERPL-MCNC: 7.4 GM/DL — SIGNIFICANT CHANGE UP (ref 6–8.3)
RBC # BLD: 4.4 M/UL — SIGNIFICANT CHANGE UP (ref 3.8–5.2)
RBC # FLD: 12.3 % — SIGNIFICANT CHANGE UP (ref 10.3–14.5)
SARS-COV-2 RNA SPEC QL NAA+PROBE: SIGNIFICANT CHANGE UP
SODIUM SERPL-SCNC: 140 MMOL/L — SIGNIFICANT CHANGE UP (ref 135–145)
TROPONIN I, HIGH SENSITIVITY RESULT: 6.95 NG/L — SIGNIFICANT CHANGE UP
TROPONIN I, HIGH SENSITIVITY RESULT: 9.14 NG/L — SIGNIFICANT CHANGE UP
WBC # BLD: 9.59 K/UL — SIGNIFICANT CHANGE UP (ref 3.8–10.5)
WBC # FLD AUTO: 9.59 K/UL — SIGNIFICANT CHANGE UP (ref 3.8–10.5)

## 2022-03-12 PROCEDURE — 85027 COMPLETE CBC AUTOMATED: CPT

## 2022-03-12 PROCEDURE — G0378: CPT

## 2022-03-12 PROCEDURE — 80053 COMPREHEN METABOLIC PANEL: CPT

## 2022-03-12 PROCEDURE — 93010 ELECTROCARDIOGRAM REPORT: CPT

## 2022-03-12 PROCEDURE — 93306 TTE W/DOPPLER COMPLETE: CPT

## 2022-03-12 PROCEDURE — 36415 COLL VENOUS BLD VENIPUNCTURE: CPT

## 2022-03-12 PROCEDURE — 71045 X-RAY EXAM CHEST 1 VIEW: CPT | Mod: 26

## 2022-03-12 PROCEDURE — 70551 MRI BRAIN STEM W/O DYE: CPT

## 2022-03-12 PROCEDURE — 99223 1ST HOSP IP/OBS HIGH 75: CPT

## 2022-03-12 PROCEDURE — 85652 RBC SED RATE AUTOMATED: CPT

## 2022-03-12 PROCEDURE — 86803 HEPATITIS C AB TEST: CPT

## 2022-03-12 PROCEDURE — 86140 C-REACTIVE PROTEIN: CPT

## 2022-03-12 PROCEDURE — 70496 CT ANGIOGRAPHY HEAD: CPT | Mod: 26,MA

## 2022-03-12 PROCEDURE — 93306 TTE W/DOPPLER COMPLETE: CPT | Mod: 26

## 2022-03-12 PROCEDURE — 99222 1ST HOSP IP/OBS MODERATE 55: CPT

## 2022-03-12 PROCEDURE — 80048 BASIC METABOLIC PNL TOTAL CA: CPT

## 2022-03-12 PROCEDURE — 71275 CT ANGIOGRAPHY CHEST: CPT | Mod: 26,MA

## 2022-03-12 PROCEDURE — 70498 CT ANGIOGRAPHY NECK: CPT | Mod: 26,MA

## 2022-03-12 PROCEDURE — 93005 ELECTROCARDIOGRAM TRACING: CPT

## 2022-03-12 PROCEDURE — 99284 EMERGENCY DEPT VISIT MOD MDM: CPT

## 2022-03-12 RX ORDER — GABAPENTIN 400 MG/1
0 CAPSULE ORAL
Qty: 0 | Refills: 0 | DISCHARGE

## 2022-03-12 RX ORDER — METOPROLOL TARTRATE 50 MG
25 TABLET ORAL DAILY
Refills: 0 | Status: DISCONTINUED | OUTPATIENT
Start: 2022-03-12 | End: 2022-03-13

## 2022-03-12 RX ORDER — DIPHENHYDRAMINE HCL 50 MG
25 CAPSULE ORAL ONCE
Refills: 0 | Status: COMPLETED | OUTPATIENT
Start: 2022-03-12 | End: 2022-03-12

## 2022-03-12 RX ORDER — METOCLOPRAMIDE HCL 10 MG
10 TABLET ORAL ONCE
Refills: 0 | Status: COMPLETED | OUTPATIENT
Start: 2022-03-12 | End: 2022-03-12

## 2022-03-12 RX ORDER — OLMESARTAN MEDOXOMIL 5 MG/1
1 TABLET, FILM COATED ORAL
Qty: 0 | Refills: 0 | DISCHARGE

## 2022-03-12 RX ORDER — BENAZEPRIL HYDROCHLORIDE 40 MG/1
1 TABLET ORAL
Qty: 0 | Refills: 0 | DISCHARGE

## 2022-03-12 RX ORDER — CLONAZEPAM 1 MG
0.5 TABLET ORAL
Refills: 0 | Status: DISCONTINUED | OUTPATIENT
Start: 2022-03-12 | End: 2022-03-14

## 2022-03-12 RX ORDER — LOSARTAN POTASSIUM 100 MG/1
100 TABLET, FILM COATED ORAL DAILY
Refills: 0 | Status: DISCONTINUED | OUTPATIENT
Start: 2022-03-12 | End: 2022-03-14

## 2022-03-12 RX ORDER — ZOLPIDEM TARTRATE 10 MG/1
5 TABLET ORAL AT BEDTIME
Refills: 0 | Status: DISCONTINUED | OUTPATIENT
Start: 2022-03-12 | End: 2022-03-14

## 2022-03-12 RX ORDER — AMITRIPTYLINE HCL 25 MG
1 TABLET ORAL
Qty: 0 | Refills: 0 | DISCHARGE

## 2022-03-12 RX ORDER — ACETAMINOPHEN 500 MG
650 TABLET ORAL EVERY 6 HOURS
Refills: 0 | Status: DISCONTINUED | OUTPATIENT
Start: 2022-03-12 | End: 2022-03-14

## 2022-03-12 RX ORDER — AMLODIPINE BESYLATE 2.5 MG/1
5 TABLET ORAL DAILY
Refills: 0 | Status: DISCONTINUED | OUTPATIENT
Start: 2022-03-12 | End: 2022-03-13

## 2022-03-12 RX ORDER — LANOLIN ALCOHOL/MO/W.PET/CERES
3 CREAM (GRAM) TOPICAL AT BEDTIME
Refills: 0 | Status: DISCONTINUED | OUTPATIENT
Start: 2022-03-12 | End: 2022-03-14

## 2022-03-12 RX ORDER — ONDANSETRON 8 MG/1
4 TABLET, FILM COATED ORAL EVERY 6 HOURS
Refills: 0 | Status: DISCONTINUED | OUTPATIENT
Start: 2022-03-12 | End: 2022-03-14

## 2022-03-12 RX ADMIN — AMLODIPINE BESYLATE 5 MILLIGRAM(S): 2.5 TABLET ORAL at 16:30

## 2022-03-12 RX ADMIN — ZOLPIDEM TARTRATE 5 MILLIGRAM(S): 10 TABLET ORAL at 21:20

## 2022-03-12 RX ADMIN — Medication 10 MILLIGRAM(S): at 04:58

## 2022-03-12 RX ADMIN — Medication 0.1 MILLIGRAM(S): at 21:16

## 2022-03-12 RX ADMIN — Medication 650 MILLIGRAM(S): at 21:16

## 2022-03-12 RX ADMIN — Medication 25 MILLIGRAM(S): at 04:59

## 2022-03-12 RX ADMIN — Medication 25 MILLIGRAM(S): at 21:16

## 2022-03-12 RX ADMIN — LOSARTAN POTASSIUM 100 MILLIGRAM(S): 100 TABLET, FILM COATED ORAL at 11:15

## 2022-03-12 NOTE — ED PROVIDER NOTE - NSICDXFAMILYHX_GEN_ALL_CORE_FT
FAMILY HISTORY:  Father  Still living? No  Family history of heart disease, Age at diagnosis: Age Unknown    Mother  Still living? No  Family history of Parkinson's disease, Age at diagnosis: Age Unknown

## 2022-03-12 NOTE — H&P ADULT - NSHPPHYSICALEXAM_GEN_ALL_CORE
VITALS:  T(F): 97.3 (03-12-22 @ 06:29), Max: 98.4 (03-12-22 @ 01:31)  HR: 82 (03-12-22 @ 06:19) (81 - 94)  BP: 134/81 (03-12-22 @ 06:29) (134/81 - 153/97)  RR: 18 (03-12-22 @ 06:29) (16 - 20)  SpO2: 99% (03-12-22 @ 06:29) (96% - 100%)      PHYSICAL EXAM:  GEN: NAD  HEENT:  pupils equal and reactive, EOMI, no oropharyngeal lesions, erythema, exudates, oral thrush  NECK:   supple, no carotid bruits, no palpable lymph nodes, no thyromegaly  CV:  +S1, +S2, regular, no murmurs or rubs  RESP:   lungs clear to auscultation bilaterally, no wheezing, rales, rhonchi, good air entry bilaterally  BREAST:  not examined  GI:  abdomen soft, non-tender, non-distended, normal BS, no bruits, no abdominal masses, no palpable masses  RECTAL:  not examined  :  not examined  MSK:   normal muscle tone, no atrophy, no rigidity, no contractions  EXT:  no clubbing, no cyanosis, no edema, no calf pain, swelling or erythema  VASCULAR:  pulses equal and symmetric in the upper and lower extremities  NEURO:  AAOX3, no focal neurological deficits, follows all commands, able to move extremities spontaneously  SKIN:  no ulcers, lesions or rashes

## 2022-03-12 NOTE — CONSULT NOTE ADULT - SUBJECTIVE AND OBJECTIVE BOX
Neurology Consult requested by:   Patient is a 67y old  Female who presents with a chief complaint of Headache (12 Mar 2022 07:40)     HPI:  68 y/o f with h/o HTN, CKD, Gammopathy, on Olmesartan, Metoprolol and Amlodipine p/w left sided chest pain, headache, left retrobulbar pain and blurry vision out of the left eye with SBP > 200 at home. Patient states her blood pressure kept going up, she took her prn clonidine without relief, but then started developing left frontal headache with blurry vision.   She denies any slurred speech or motor weakness with the event. She also noted to have some left sided chest pain which radiated to her arm.  She reports having a history of migraines but usually they are shorter lasting. Usually has 1-2 per month, last several hours, followed by fatigue the next day. Today feeling better.    PAST MEDICAL & SURGICAL HISTORY:  Hypertension    Proteinuria    Essential hypertension  15  years    Gammopathy    Peritonitis    H/O dilation and curettage      History of colon surgery      H/O:   ,,    S/P colon resection    H/O ovarian cystectomy     delivery delivered  X3...., ,     S/P small bowel resection    Abdominal fistula    [place]    H/O ovarian cystectomy  each side, left removed, right 3/4 removed      FAMILY HISTORY:  Family history of Parkinson&#x27;s disease (Mother)    Family history of heart disease (Father)      Social Hx:  Nonsmoker, no drug or alcohol use  Medications and Allergies ReviewedMEDICATIONS  (STANDING):  amLODIPine   Tablet 5 milliGRAM(s) Oral daily  losartan 100 milliGRAM(s) Oral daily  metoprolol succinate ER 25 milliGRAM(s) Oral daily     ROS: Pertinent positives in HPI, all other ROS were reviewed and are negative.      Examination:   Vital Signs Last 24 Hrs  T(C): 36.5 (12 Mar 2022 08:49), Max: 36.9 (12 Mar 2022 01:31)  T(F): 97.7 (12 Mar 2022 08:49), Max: 98.4 (12 Mar 2022 01:31)  HR: 87 (12 Mar 2022 08:49) (81 - 94)  BP: 123/83 (12 Mar 2022 08:49) (123/83 - 153/97)  BP(mean): 90 (12 Mar 2022 05:34) (90 - 133)  RR: 18 (12 Mar 2022 08:49) (16 - 20)  SpO2: 100% (12 Mar 2022 08:49) (96% - 100%)  General: Cooperative, NAD   NECK: supple, no masses  ENT: Normal hearing   Vascular : no carotid bruits,   Lungs: CTAB  Chest: RRR, no murmurs  Extremities: nontender, no edema  Musculoskeletal: no adventitious movements, no joint stiffness  Skin: no rash    Neurological Examination:  NIHSS:0  MS: AOx3. Appropriately interactive, normal affect. Speech fluent w/o paraphasic error, repetition, naming, reading is intact   CN: VFFTC, PERLL, EOMI, V1-3 sensation intact, face symmetric, hearing intact, palate elevates symmetrically, tongue midline, SCM equal bilaterally  Motor: normal bulk and tone, no tremor, rigidity or bradykinesia.  5/5 all over   Sens: Intact to light touch.    Reflexes: 2/4 all over, downgoing toes b/l  Coord:  No dysmetria, DEVAN intact   Gait: Cannot test    Labs:   Comprehensive Metabolic Panel (22 @ 02:27)   Sodium, Serum: 140 mmol/L   Potassium, Serum: 3.7 mmol/L   Chloride, Serum: 109 mmol/L   Carbon Dioxide, Serum: 26 mmol/L   Anion Gap, Serum: 5 mmol/L   Blood Urea Nitrogen, Serum: 15 mg/dL   Creatinine, Serum: 1.02 mg/dL   Glucose, Serum: 117 mg/dL   Calcium, Total Serum: 9.0 mg/dL   Protein Total, Serum: 7.4 gm/dL   Albumin, Serum: 3.8 g/dL   Bilirubin Total, Serum: 0.9 mg/dL   Alkaline Phosphatase, Serum: 60 U/L   Aspartate Aminotransferase (AST/SGOT): 21 U/L   Alanine Aminotransferase (ALT/SGPT): 38 U/L   eGFR: 60:     Complete Blood Count + Automated Diff (22 @ 02:27)   WBC Count: 9.59 K/uL   RBC Count: 4.40 M/uL   Hemoglobin: 14.3 g/dL   Hematocrit: 42.9 %   Mean Cell Volume: 97.5 fl   Mean Cell Hemoglobin: 32.5 pg   Mean Cell Hemoglobin Conc: 33.3 gm/dL   Red Cell Distrib Width: 12.3 %   Platelet Count - Automated: 261 K/uL     < from: CT Angio Neck w/ IV Cont (22 @ 03:30) >    IMPRESSION:  NONCONTRAST CT BRAIN: No intracranial bleed, mass effect or acute   territorial infarct.    CTA BRAIN: Patent anterior and posterior circulations without   flow-limiting stenosis or vascular occlusion.    CTA NECK: Patent anterior and posterior circulations without significant   ICA stenosis as per NASCET criteria.    < end of copied text >    
68 y/o f with h/o     HTN,   CKD,   Gammopathy, on Olmesartan,   Metoprolol and Amlodipine     p/w left sided chest pain, headache,   left retrobulbar pain and blurry vision out of the left eye with   SBP > 200 at home.   Patient states her blood pressure kept going up,   she took her prn clonidine without relief, but then   started developing left frontal headache with blurry vision.   She denies any slurred speech or motor weakness with the event.   She also noted to have some left sided chest pain which radiated to her arm.   She reports having a history of migraines   but denies any similar symptoms in the past.   She is being admitted for further evaluation.     Admitted for HTN emergency, headache w/ blurry vision.  BPs in ED 160s, on tele in 120s-130s.  Pt currently asymptomatic.  Reviewed BP diary - BPs in 160s on evening prior to admit  otherwise generally well controlled, 1x week runs higher in 150s.    PAST MEDICAL AND SURGICAL HISTORY:  PAST MEDICAL & SURGICAL HISTORY:  Hypertension    Proteinuria    Essential hypertension  15  years    Gammopathy    Peritonitis    H/O dilation and curettage      History of colon surgery      H/O:   ,,    S/P colon resection    H/O ovarian cystectomy     delivery delivered  X3...., ,     S/P small bowel resection    Abdominal fistula    [place]    H/O ovarian cystectomy  each side, left removed, right 3/4 removed        ALLERGIES:  Allergies    sulfa drugs (Hives)  Sulfac 10% (Hives)    Intolerances        SOCIAL HISTORY:  Social History:  No ETD (12 Mar 2022 07:40)      FAMILY  HISTORY:  FAMILY HISTORY:  Family history of Parkinson&#x27;s disease (Mother)    Family history of heart disease (Father)        MEDICATIONS:  OUTPATIENT:  Home Medications:  amLODIPine 5 mg oral tablet: 1 tab(s) orally once a day (12 Mar 2022 07:44)  Imitrex 100 mg oral tablet: 1 tab(s) orally once (15 Feb 2017 10:38)  metoprolol succinate 25 mg oral tablet, extended release: 1 tab(s) orally once a day (12 Mar 2022 07:44)  olmesartan 40 mg oral tablet: 1 tab(s) orally once a day (12 Mar 2022 07:45)      INPATIENT:  MEDICATIONS  (STANDING):  amLODIPine   Tablet 5 milliGRAM(s) Oral daily  losartan 100 milliGRAM(s) Oral daily  metoprolol succinate ER 25 milliGRAM(s) Oral daily    MEDICATIONS  (PRN):  acetaminophen     Tablet .. 650 milliGRAM(s) Oral every 6 hours PRN Mild Pain (1 - 3)  aluminum hydroxide/magnesium hydroxide/simethicone Suspension 30 milliLiter(s) Oral every 4 hours PRN Dyspepsia  clonazePAM  Tablet 0.5 milliGRAM(s) Oral two times a day PRN Anxiety  cloNIDine 0.1 milliGRAM(s) Oral every 6 hours PRN SBP >160  melatonin 3 milliGRAM(s) Oral at bedtime PRN Insomnia  ondansetron Injectable 4 milliGRAM(s) IV Push every 6 hours PRN Nausea and/or Vomiting  zolpidem 5 milliGRAM(s) Oral at bedtime PRN Insomnia    MEDICATIONS  (PRN):  acetaminophen     Tablet .. 650 milliGRAM(s) Oral every 6 hours PRN Mild Pain (1 - 3)  aluminum hydroxide/magnesium hydroxide/simethicone Suspension 30 milliLiter(s) Oral every 4 hours PRN Dyspepsia  clonazePAM  Tablet 0.5 milliGRAM(s) Oral two times a day PRN Anxiety  cloNIDine 0.1 milliGRAM(s) Oral every 6 hours PRN SBP >160  melatonin 3 milliGRAM(s) Oral at bedtime PRN Insomnia  ondansetron Injectable 4 milliGRAM(s) IV Push every 6 hours PRN Nausea and/or Vomiting  zolpidem 5 milliGRAM(s) Oral at bedtime PRN Insomnia    REVIEW OF SYSTEMS:  ===============================  ===============================  CONSTITUTIONAL: No weakness, fevers or chills  EYES/ENT: No visual changes;  No vertigo or throat pain   NECK: No pain or stiffness  RESPIRATORY: No cough, wheezing, hemoptysis; No shortness of breath  CARDIOVASCULAR: No chest pain or palpitations  GASTROINTESTINAL: No abdominal or epigastric pain. No nausea, vomiting, or hematemesis;   No diarrhea or constipation. No melena or hematochezia.  GENITOURINARY: No dysuria, frequency or hematuria  NEUROLOGICAL: No numbness or weakness  SKIN: No itching, burning, rashes, or lesions   All other review of systems is negative unless indicated above    Vital Signs Last 24 Hrs  T(C): 36.8 (12 Mar 2022 16:37), Max: 36.9 (12 Mar 2022 01:31)  T(F): 98.2 (12 Mar 2022 16:37), Max: 98.4 (12 Mar 2022 01:31)  HR: 75 (12 Mar 2022 16:37) (75 - 94)  BP: 142/94 (12 Mar 2022 16:37) (123/83 - 153/97)  BP(mean): 90 (12 Mar 2022 05:34) (90 - 133)  RR: 18 (12 Mar 2022 16:37) (16 - 20)  SpO2: 100% (12 Mar 2022 16:37) (96% - 100%)    I&O's Summary      I&O's Detail      PHYSICAL EXAM:    Constitutional: NAD, awake and alert, well-developed  HEENT: PERR, EOMI,  No oral cyananosis.  Neck:  supple,  No JVD  Respiratory: Breath sounds are clear bilaterally, No wheezing, rales or rhonchi  Cardiovascular: S1 and S2, regular rate and rhythm, no Murmurs, gallops or rubs  Gastrointestinal: Bowel Sounds present, soft, nontender.   Extremities: No peripheral edema. No clubbing or cyanosis.  Vascular: 2+ peripheral pulses  Neurological: A/O x 3, no focal deficits  Musculoskeletal: no calf tenderness.  Skin: No rashes.    ===============================  ===============================  LABS:                         14.3   9.59  )-----------( 261      ( 12 Mar 2022 02:27 )             42.9     12 Mar 2022 02:27    140    |  109    |  15     ----------------------------<  117    3.7     |  26     |  1.02     Ca    9.0        12 Mar 2022 02:  Mg     2.4       12 Mar 2022 02:27    TPro  7.4    /  Alb  3.8    /  TBili  0.9    /  DBili  x      /  AST  21     /  ALT  38     /  AlkPhos  60     12 Mar 2022 02:27        THYROID STUDIES:    ===============================  ===============================  CARDIAC BIOMARKERS:  -BNP VALUES:      -TROPONIN VALUES:  -------  lab item   Troponin I, High Sensitivity Result: 9.14 ng/L (22 @ 04:34)  Troponin I, High Sensitivity Result: 6.95 ng/L (22 @ 02:27)  Troponin I, Serum: <0.015 ng/mL [0.015 - 0.045] (21 @ 11:43)    ===============================  ===============================  EKG: NSR, no ischemic changes.    TELE: normal sinus    ===============================  ECHO:  pending.  ===============================  CARDIAC CATH:    ===============================  STRESS TESTING:                                    ===============================    Billy Stevens M.D.  Cardiology, [facility]  Cell: [phone]  Office:   [phone] ([facility] Office)  [phone] ([facility] Office)      =================

## 2022-03-12 NOTE — ED ADULT NURSE REASSESSMENT NOTE - NS ED NURSE REASSESS COMMENT FT1
Airway patent, does not show any s/s of respiratory distress, breathing is unlabored, color is culturally appropriate,  vs are WNL,

## 2022-03-12 NOTE — CONSULT NOTE ADULT - PROBLEM SELECTOR RECOMMENDATION 9
Currently BPs controlled. Etiology of exacerbation of BPs unclear  -no high salt foods on day of admit, compliant w/ meds, no emotional stress.  On clonidine 0.1 mg PRN for BP  REccomend adding hydralazine 25 mg po TID PRN SBPs >150s.  cont. other OP meds.    OK to d/c from cardiac standpoint w/ FU in cardio clinic in 1 week.

## 2022-03-12 NOTE — ED PROVIDER NOTE - NSICDXPASTMEDICALHX_GEN_ALL_CORE_FT
PAST MEDICAL HISTORY:  Essential hypertension 15  years    Gammopathy     Hypertension     Peritonitis     Proteinuria

## 2022-03-12 NOTE — H&P ADULT - ASSESSMENT
66 y/o f with h/o HTN on Olmesartan, Metoprolol and Amlodipine p/w left sided chest pain, headache, left retrobulbar pain and blurry vision out of the left eye with SBP > 200 at home.      # HTN emergency  - Blood pressure stable this am  - Olmersartan 40  - Metoprolol Er 25   - Amlodipine 5  - Clonidine 0.1 mg q 6 h prn  - Check echo  - EKG this am  - Trend troponin, Negative x 2   - History of proteinuria/CKD  - Check TSH    # Headache with blurry vision  - Doubt TIA  - CTA/CT Head negative  - Check MRI head    # Anxiety  - Klonopin 0.5 mg BID Prn    # Insomnia   - Ambien prn     # DVT prophylaxis  - Ambulation  68 y/o f with h/o HTN on Olmesartan, Metoprolol and Amlodipine p/w left sided chest pain, headache, left retrobulbar pain and blurry vision out of the left eye with SBP > 200 at home.      # HTN emergency  - Blood pressure stable this am  - Olmersartan 40  - Metoprolol Er 25   - Amlodipine 5  - Clonidine 0.1 mg q 6 h prn  - Check echo  - EKG this am  - Trend troponin, Negative x 2   - History of proteinuria/CKD  - Check TSH    # Headache with blurry vision  - Doubt TIA  - CTA/CT Head negative  - Check MRI head  - Would avoid aspirin with labile BP for now    # Anxiety  - Klonopin 0.5 mg BID Prn    # Insomnia   - Ambien prn     # DVT prophylaxis  - Ambulation  66 y/o f with h/o HTN on Olmesartan, Metoprolol and Amlodipine p/w left sided chest pain, headache, left retrobulbar pain and blurry vision out of the left eye with SBP > 200 at home.      # HTN emergency  - Blood pressure stable this am  - Olmersartan 40  - Metoprolol Er 25   - Amlodipine 5  - Clonidine 0.1 mg q 6 h prn  - Check echo  - EKG this am  - Trend troponin, Negative x 2   - History of proteinuria/CKD  - Check TSH    # Headache with blurry vision  - Doubt TIA  - CTA/CT Head negative  - Check MRI head  - Would avoid aspirin with labile BP for now  - Neuro checks    # Anxiety  - Klonopin 0.5 mg BID Prn    # Insomnia   - Ambien prn     # DVT prophylaxis  - Ambulation  68 y/o f with h/o HTN on Olmesartan, Metoprolol and Amlodipine p/w left sided chest pain, headache, left retrobulbar pain and blurry vision out of the left eye with SBP > 200 at home.      # HTN emergency  - Blood pressure stable this am  - Olmersartan 40  - Metoprolol Er 25   - Amlodipine 5  - Clonidine 0.1 mg q 6 h prn  - Check echo  - EKG this am  - Trend troponin, Negative x 2   - History of proteinuria/CKD  - Check TSH    # Headache with blurry vision  - Doubt TIA  - CTA/CT Head negative  - Check MRI head  - Would avoid aspirin with labile BP for now  - Neuro checks  - Neurology consult     # Anxiety  - Klonopin 0.5 mg BID Prn    # Insomnia   - Ambien prn     # DVT prophylaxis  - Ambulation

## 2022-03-12 NOTE — H&P ADULT - NSHPLABSRESULTS_GEN_ALL_CORE
LABS:                            14.3   9.59  )-----------( 261      ( 12 Mar 2022 02:27 )             42.9     03-12    140  |  109<H>  |  15  ----------------------------<  117<H>  3.7   |  26  |  1.02    Ca    9.0      12 Mar 2022 02:27  Mg     2.4     03-12    TPro  7.4  /  Alb  3.8  /  TBili  0.9  /  DBili  x   /  AST  21  /  ALT  38  /  AlkPhos  60  03-12      LIVER FUNCTIONS - ( 12 Mar 2022 02:27 )  Alb: 3.8 g/dL / Pro: 7.4 gm/dL / ALK PHOS: 60 U/L / ALT: 38 U/L / AST: 21 U/L / GGT: x    EKG: NSR no acute change  CTA Chest: Negative for PE  CTA NECK/HEAD: Negative for acute pathology

## 2022-03-12 NOTE — CONSULT NOTE ADULT - ASSESSMENT
68 y/o woman with h/o HTN, p/w headache, blurry vision. non focal exam. CTA/CT Head negative for acute CVA, LVO. Possible migraine headaches.  -  MRI head pending  -  f/u B/p  - Consider switching metoprolol for Inderal la 120 mg qd  - may benefit from Ubrelvy 100 mg po PRN headache as outpatient- once d/c can f/u with my office

## 2022-03-12 NOTE — ED ADULT TRIAGE NOTE - CHIEF COMPLAINT QUOTE
Pt c/o high blood pressure readings (205/110) last night- compliant with BP meds. Endorses chest pressure, headache, and pressure behind left eye x 1 hour. Denies blurry/double vision and N/V. Took 325mg aspirin PTA. Denies other anticoagulant use. No noted neuro deficits in triage. MD Mckeon to triage, no code stroke called. EKG in progress.

## 2022-03-12 NOTE — ED PROVIDER NOTE - PROGRESS NOTE DETAILS
admission endorsed to Dr. Haas (Hospitalist) to tele with bridge orders and neuro consult.  Zeus Mckeon, DO

## 2022-03-12 NOTE — ED ADULT NURSE NOTE - NSICDXPASTSURGICALHX_GEN_ALL_CORE_FT
PAST SURGICAL HISTORY:  Abdominal fistula      delivery delivered X3...., ,     H/O dilation and curettage     H/O ovarian cystectomy     H/O ovarian cystectomy each side, left removed, right 3/4 removed    H/O:  ,,    History of colon surgery     [place]     S/P colon resection     S/P small bowel resection

## 2022-03-12 NOTE — ED ADULT NURSE NOTE - OBJECTIVE STATEMENT
66 y/o alert female comes into the ED for c/o of "chest pressure." pt also reports elevated BP at home and was told by her provider to take Clonidine for elevated BP, pt also had complaints of headache, blurry vision. pmhx, of HTN.  MD Guerrero at bedside called off code stroke, NIH score is 0, no neuro deficit noted at bedside, no slurred speech, pt able to move all extremities, and follow commands, Airway is patent no s/s of respiratory distress, breathing is unlabored color is culturally appropriate, GCS is 15, EKG performed at bedside, VS are WNL 68 y/o alert female comes into the ED for c/o of "chest pressure." pt also reports elevated BP at home and was told by her provider to take Clonidine for elevated BP, pt also had complaints of headache, blurry vision. pmhx, of HTN.  MD Guerrero at bedside called off code stroke, NIH score is 0, no neuro deficit noted at bedside, no slurred speech, pt able to move all extremities, and follow commands, Airway is patent no s/s of respiratory distress, breathing is unlabored color is culturally appropriate, GCS is 15, EKG performed at bedside, VS are WNL

## 2022-03-12 NOTE — ED PROVIDER NOTE - NSICDXPASTSURGICALHX_GEN_ALL_CORE_FT
PAST SURGICAL HISTORY:  Abdominal fistula      delivery delivered X3...., ,     H/O dilation and curettage     H/O ovarian cystectomy     H/O ovarian cystectomy each side, left removed, right 3/4 removed    H/O:  ,,    History of colon surgery     [place]     S/P colon resection     S/P small bowel resection

## 2022-03-12 NOTE — H&P ADULT - HISTORY OF PRESENT ILLNESS
68 y/o f with h/o HTN, CKD, Gammopathy, on Olmesartan, Metoprolol and Amlodipine p/w left sided chest pain, headache, left retrobulbar pain and blurry vision out of the left eye with SBP > 200 at home. Patient states her blood pressure kept going up, she took her prn clonidine without relief, but then started developing left frontal headache with blurry vision. She denies any slurred speech or motor weakness with the event. She also noted to have some left sided chest pain which radiated to her arm. She reports having a history of migraines but denies any similar symptoms in the past. She is being admitted for further evaluation.

## 2022-03-12 NOTE — ED ADULT NURSE NOTE - CAS EDN DISCHARGE ASSESSMENT
decreased sequencing ability/decreased weight-shifting ability/decreased step length/decreased balance during turns
Alert and oriented to person, place and time

## 2022-03-12 NOTE — ED PROVIDER NOTE - CLINICAL SUMMARY MEDICAL DECISION MAKING FREE TEXT BOX
NIHSS zero on evaluation and BP trending down after Clonidine taken at home.  Concern for TIA so will get CTA head and neck as well as cardiac workup and admit for q 4 hour neuro checks

## 2022-03-12 NOTE — H&P ADULT - NSICDXPASTSURGICALHX_GEN_ALL_CORE_FT
PAST SURGICAL HISTORY:  Abdominal fistula      delivery delivered X3...., ,     H/O dilation and curettage     H/O ovarian cystectomy     H/O ovarian cystectomy each side, left removed, right 3/4 removed    H/O:  ,,    History of colon surgery     [place]     S/P colon resection     S/P small bowel resection

## 2022-03-13 LAB
ALBUMIN SERPL ELPH-MCNC: 3.7 G/DL — SIGNIFICANT CHANGE UP (ref 3.3–5)
ALP SERPL-CCNC: 57 U/L — SIGNIFICANT CHANGE UP (ref 40–120)
ALT FLD-CCNC: 33 U/L — SIGNIFICANT CHANGE UP (ref 12–78)
ANION GAP SERPL CALC-SCNC: 6 MMOL/L — SIGNIFICANT CHANGE UP (ref 5–17)
AST SERPL-CCNC: 28 U/L — SIGNIFICANT CHANGE UP (ref 15–37)
BILIRUB SERPL-MCNC: 1.3 MG/DL — HIGH (ref 0.2–1.2)
BUN SERPL-MCNC: 11 MG/DL — SIGNIFICANT CHANGE UP (ref 7–23)
CALCIUM SERPL-MCNC: 8.6 MG/DL — SIGNIFICANT CHANGE UP (ref 8.5–10.1)
CHLORIDE SERPL-SCNC: 110 MMOL/L — HIGH (ref 96–108)
CO2 SERPL-SCNC: 23 MMOL/L — SIGNIFICANT CHANGE UP (ref 22–31)
CREAT SERPL-MCNC: 1.05 MG/DL — SIGNIFICANT CHANGE UP (ref 0.5–1.3)
EGFR: 58 ML/MIN/1.73M2 — LOW
ERYTHROCYTE [SEDIMENTATION RATE] IN BLOOD: 8 MM/HR — SIGNIFICANT CHANGE UP (ref 0–20)
GLUCOSE SERPL-MCNC: 119 MG/DL — HIGH (ref 70–99)
HCT VFR BLD CALC: 44.3 % — SIGNIFICANT CHANGE UP (ref 34.5–45)
HCV AB S/CO SERPL IA: 0.1 S/CO — SIGNIFICANT CHANGE UP (ref 0–0.99)
HCV AB SERPL-IMP: SIGNIFICANT CHANGE UP
HGB BLD-MCNC: 14.6 G/DL — SIGNIFICANT CHANGE UP (ref 11.5–15.5)
MCHC RBC-ENTMCNC: 31.7 PG — SIGNIFICANT CHANGE UP (ref 27–34)
MCHC RBC-ENTMCNC: 33 GM/DL — SIGNIFICANT CHANGE UP (ref 32–36)
MCV RBC AUTO: 96.3 FL — SIGNIFICANT CHANGE UP (ref 80–100)
PLATELET # BLD AUTO: 266 K/UL — SIGNIFICANT CHANGE UP (ref 150–400)
POTASSIUM SERPL-MCNC: 3.4 MMOL/L — LOW (ref 3.5–5.3)
POTASSIUM SERPL-SCNC: 3.4 MMOL/L — LOW (ref 3.5–5.3)
PROT SERPL-MCNC: 7.4 GM/DL — SIGNIFICANT CHANGE UP (ref 6–8.3)
RBC # BLD: 4.6 M/UL — SIGNIFICANT CHANGE UP (ref 3.8–5.2)
RBC # FLD: 12.4 % — SIGNIFICANT CHANGE UP (ref 10.3–14.5)
SODIUM SERPL-SCNC: 139 MMOL/L — SIGNIFICANT CHANGE UP (ref 135–145)
WBC # BLD: 7.08 K/UL — SIGNIFICANT CHANGE UP (ref 3.8–10.5)
WBC # FLD AUTO: 7.08 K/UL — SIGNIFICANT CHANGE UP (ref 3.8–10.5)

## 2022-03-13 PROCEDURE — 70551 MRI BRAIN STEM W/O DYE: CPT | Mod: 26

## 2022-03-13 PROCEDURE — 99233 SBSQ HOSP IP/OBS HIGH 50: CPT

## 2022-03-13 RX ORDER — AMLODIPINE BESYLATE 2.5 MG/1
10 TABLET ORAL DAILY
Refills: 0 | Status: DISCONTINUED | OUTPATIENT
Start: 2022-03-14 | End: 2022-03-14

## 2022-03-13 RX ORDER — PROPRANOLOL HCL 160 MG
120 CAPSULE, EXTENDED RELEASE 24HR ORAL AT BEDTIME
Refills: 0 | Status: DISCONTINUED | OUTPATIENT
Start: 2022-03-13 | End: 2022-03-14

## 2022-03-13 RX ORDER — ENOXAPARIN SODIUM 100 MG/ML
40 INJECTION SUBCUTANEOUS EVERY 24 HOURS
Refills: 0 | Status: DISCONTINUED | OUTPATIENT
Start: 2022-03-13 | End: 2022-03-14

## 2022-03-13 RX ORDER — POTASSIUM CHLORIDE 20 MEQ
40 PACKET (EA) ORAL EVERY 4 HOURS
Refills: 0 | Status: COMPLETED | OUTPATIENT
Start: 2022-03-13 | End: 2022-03-13

## 2022-03-13 RX ORDER — AMLODIPINE BESYLATE 2.5 MG/1
5 TABLET ORAL ONCE
Refills: 0 | Status: COMPLETED | OUTPATIENT
Start: 2022-03-13 | End: 2022-03-13

## 2022-03-13 RX ADMIN — ENOXAPARIN SODIUM 40 MILLIGRAM(S): 100 INJECTION SUBCUTANEOUS at 17:19

## 2022-03-13 RX ADMIN — LOSARTAN POTASSIUM 100 MILLIGRAM(S): 100 TABLET, FILM COATED ORAL at 09:17

## 2022-03-13 RX ADMIN — Medication 40 MILLIEQUIVALENT(S): at 13:10

## 2022-03-13 RX ADMIN — Medication 650 MILLIGRAM(S): at 20:23

## 2022-03-13 RX ADMIN — Medication 40 MILLIEQUIVALENT(S): at 17:19

## 2022-03-13 RX ADMIN — ZOLPIDEM TARTRATE 5 MILLIGRAM(S): 10 TABLET ORAL at 21:56

## 2022-03-13 RX ADMIN — Medication 650 MILLIGRAM(S): at 04:52

## 2022-03-13 RX ADMIN — Medication 120 MILLIGRAM(S): at 21:55

## 2022-03-13 RX ADMIN — AMLODIPINE BESYLATE 5 MILLIGRAM(S): 2.5 TABLET ORAL at 13:35

## 2022-03-13 RX ADMIN — Medication 0.1 MILLIGRAM(S): at 06:33

## 2022-03-13 NOTE — PROGRESS NOTE ADULT - ASSESSMENT
68 y/o f with h/o HTN on Olmesartan, Metoprolol and Amlodipine p/w left sided chest pain, headache, left retrobulbar pain and blurry vision out of the left eye with SBP > 200 at home.      # HTN emergency  - Blood pressure improved, but still required PRN this am  - Will give extra 5mg of Amlodipine, increase daily dose to 10mg   - C/w Olmesartan 40  - C/w BB   - Clonidine 0.1 mg q 6 h prn  - EKG: no acute changes  - Trend troponin, Negative  -  TSH wnl     # Headache with blurry vision  - Doubt TIA  - CTA/CT Head negative for acute findings   - MRI head done, report pending   - Neuro checks  - add Fioricet PRN  - Change BB to Inderal LA 120mg QHS  - Check ESR/CRP  - Neuro f/u     # Anxiety  - Klonopin 0.5 mg BID Prn    # Insomnia   - Ambien prn     # CKD   as per Pt baseline cr around 1.8  inPt BMP with CR ~1  Monitor     # DVT prophylaxis  start lovenox

## 2022-03-13 NOTE — PROGRESS NOTE ADULT - SUBJECTIVE AND OBJECTIVE BOX
CC: Headache (13 Mar 2022 11:51)    HPI: 68 y/o f with h/o HTN, CKD, Gammopathy, on Olmesartan, Metoprolol and Amlodipine p/w left sided chest pain, headache, left retrobulbar pain and blurry vision out of the left eye with SBP > 200 at home. Patient states her blood pressure kept going up, she took her prn clonidine without relief, but then started developing left frontal headache with blurry vision. She denies any slurred speech or motor weakness with the event. She also noted to have some left sided chest pain which radiated to her arm. She reports having a history of migraines but denies any similar symptoms in the past. She is being admitted for further evaluation.  (12 Mar 2022 07:40)    INTERVAL HPI/ OVERNIGHT EVENTS:  chart reviewed, Pt was seen and examined, reports HA , retroorbital, no blurry vision, 2-3/10, overall  improved. Denies CP, no SOB. POC discussed pt is  anxious about BP being controlled     Vital Signs Last 24 Hrs  T(C): 36.6 (13 Mar 2022 08:35), Max: 36.8 (12 Mar 2022 16:37)  T(F): 97.9 (13 Mar 2022 08:35), Max: 98.2 (12 Mar 2022 16:37)  HR: 85 (13 Mar 2022 08:35) (75 - 102)  BP: 135/91 (13 Mar 2022 13:28) (112/82 - 176/104)  RR: 18 (13 Mar 2022 08:35) (16 - 18)  SpO2: 98% (13 Mar 2022 08:35) (98% - 100%)      REVIEW OF SYSTEMS:  All other review of systems is negative unless indicated above.      PHYSICAL EXAM:  General: Well developed; well nourished; in no acute distress  Eyes: PERRLA, EOMI; conjunctiva and sclera clear  Head: Normocephalic; atraumatic, no temporal tenderness to palpation   ENMT: No nasal discharge; airway clear  Neck: Supple; non tender; no masses  Respiratory: No wheezes, rales or rhonchi  Cardiovascular: Regular rate and rhythm. S1 and S2 Normal;   Gastrointestinal: Soft non-tender non-distended; Normal bowel sounds  Genitourinary: No  suprapubic  tenderness  Extremities: No  edema  Vascular: Peripheral pulses palpable 2+ bilaterally  Neurological: Alert and oriented x3,  no facial symmetry, speech is clear, MS 5/5   Skin: Warm and dry. No acute rash  Musculoskeletal: Normal muscle tone, without deformities  Psychiatric: Cooperative and appropriate      LABS:                         14.6   7.08  )-----------( 266      ( 13 Mar 2022 07:52 )             44.3     13 Mar 2022 07:52    139    |  110    |  11     ----------------------------<  119    3.4     |  23     |  1.05     Ca    8.6        13 Mar 2022 07:52  Mg     2.4       12 Mar 2022 02:27    TPro  7.4    /  Alb  3.7    /  TBili  1.3    /  DBili  x      /  AST  28     /  ALT  33     /  AlkPhos  57     13 Mar 2022 07:52    LIVER FUNCTIONS - ( 13 Mar 2022 07:52 )  Alb: 3.7 g/dL / Pro: 7.4 gm/dL / ALK PHOS: 57 U/L / ALT: 33 U/L / AST: 28 U/L / GGT: x               MEDICATIONS  (STANDING):  amLODIPine   Tablet 5 milliGRAM(s) Oral daily  losartan 100 milliGRAM(s) Oral daily  metoprolol succinate ER 25 milliGRAM(s) Oral daily  potassium chloride    Tablet ER 40 milliEquivalent(s) Oral every 4 hours    MEDICATIONS  (PRN):  acetaminophen     Tablet .. 650 milliGRAM(s) Oral every 6 hours PRN Mild Pain (1 - 3)  aluminum hydroxide/magnesium hydroxide/simethicone Suspension 30 milliLiter(s) Oral every 4 hours PRN Dyspepsia  clonazePAM  Tablet 0.5 milliGRAM(s) Oral two times a day PRN Anxiety  cloNIDine 0.1 milliGRAM(s) Oral every 6 hours PRN SBP >160  melatonin 3 milliGRAM(s) Oral at bedtime PRN Insomnia  ondansetron Injectable 4 milliGRAM(s) IV Push every 6 hours PRN Nausea and/or Vomiting  zolpidem 5 milliGRAM(s) Oral at bedtime PRN Insomnia      RADIOLOGY & ADDITIONAL TESTS:  < from: CT Angio Neck w/ IV Cont (03.12.22 @ 03:30) >  ACC: [id number] EXAM:  CT ANGIO NECK (W)AW IC                        ACC: [id number] EXAM:  CT ANGIO BRAIN (W)AW IC                          PROCEDURE DATE:  03/12/2022          INTERPRETATION:  CLINICAL INFORMATION:  headache and left visual changes    TECHNIQUE: Axial CT images of the brain obtained without intravenous   contrast. Coronal and sagittal reformatted images provided.  Axial CT images were acquired through the  neck and head  during the   arterial phase.  Intravenous contrast:  90 cc of Omnipaque-350 mg/ml were administered; 10   cc were discarded.  Three-dimensional MIP reformats were generated.    COMPARISON STUDY: None.    FINDINGS:  NONCONTRAST CT BRAIN: The ventricles and cortical sulci are   age-appropriate. There is mild periventricular white matter small vessel   ischemic disease, presumed chronic. There is no intracranial bleed,   extra-axial fluid collection, mass effect, midline shift, hydrocephalus,   acute territorial infarct or depressed skull fracture evident. Imaged   orbits, paranasal sinuses and mastoid air cells are grossly unremarkable.    CT ANGIOGRAPHY BRAIN: Anterior and posterior circulations are patent   without aneurysmal dilatation, dissection, flow-limiting stenosis or   vascular occlusion. Dural sinuses are patent.    CT ANGIOGRAPHY NECK: The anterior posterior circulations are patent   without aneurysmal dilatation, dissection, flow-limiting stenosis or   vascular occlusion. No significant ICA stenosis as per NASCET criteria.    Suggestionof a 1.6 cm nodule of the inferior thyroid isthmus, for which   further evaluation with thyroid sonogram can be obtained on a nonemergent   basis. Visualized lung apices are clear. Mild cervical spondylosis   evident.        IMPRESSION:  NONCONTRAST CT BRAIN: No intracranial bleed, mass effect or acute   territorial infarct.    CTA BRAIN: Patent anterior and posterior circulations without   flow-limiting stenosis or vascular occlusion.    CTA NECK: Patent anterior and posterior circulations without significant   ICA stenosis as per NASCET criteria.

## 2022-03-13 NOTE — PROGRESS NOTE ADULT - SUBJECTIVE AND OBJECTIVE BOX
66 y/o f with h/o HTN, CKD, Gammopathy, on Olmesartan, Metoprolol and Amlodipine p/w left sided chest pain, headache, left retrobulbar pain and blurry vision out of the left eye with SBP > 200 at home. Patient states her blood pressure kept going up, she took her prn clonidine without relief, but then started developing left frontal headache with blurry vision. She denies any slurred speech or motor weakness with the event. She also noted to have some left sided chest pain which radiated to her arm. She reports having a history of migraines but denies any similar symptoms in the past. She is being admitted for further evaluation.   Admitted for HTN emergency, headache w/ blurry vision.  BPs in ED 160s, on tele in 120s-130s.  Pt currently asymptomatic.  Reviewed BP diary - BPs in 160s on evening prior to admit  otherwise generally well controlled, 1x week runs higher in 150s.    3/13/22 Pt OOB to chair is very concerned that her b/p continues to be liable. Systolic has been     PAST MEDICAL AND SURGICAL HISTORY:  PAST MEDICAL & SURGICAL HISTORY:  Hypertension    Proteinuria    Essential hypertension  15  years    Gammopathy    Peritonitis    H/O dilation and curettage      History of colon surgery      H/O:   ,,    S/P colon resection    H/O ovarian cystectomy     delivery delivered  X3...., ,     S/P small bowel resection    Abdominal fistula    [place]    H/O ovarian cystectomy  each side, left removed, right 3/4 removed        ALLERGIES:  Allergies    sulfa drugs (Hives)  Sulfac 10% (Hives)    Intolerances        SOCIAL HISTORY:  Social History:  No ETD (12 Mar 2022 07:40)      FAMILY  HISTORY:  FAMILY HISTORY:  Family history of Parkinson&#x27;s disease (Mother)    Family history of heart disease (Father)        MEDICATIONS:  OUTPATIENT:  Home Medications:  amLODIPine 5 mg oral tablet: 1 tab(s) orally once a day (12 Mar 2022 07:44)  Imitrex 100 mg oral tablet: 1 tab(s) orally once (15 Feb 2017 10:38)  metoprolol succinate 25 mg oral tablet, extended release: 1 tab(s) orally once a day (12 Mar 2022 07:44)  olmesartan 40 mg oral tablet: 1 tab(s) orally once a day (12 Mar 2022 07:45)      INPATIENT:  MEDICATIONS  (STANDING):  amLODIPine   Tablet 5 milliGRAM(s) Oral daily  losartan 100 milliGRAM(s) Oral daily  metoprolol succinate ER 25 milliGRAM(s) Oral daily    MEDICATIONS  (PRN):  acetaminophen     Tablet .. 650 milliGRAM(s) Oral every 6 hours PRN Mild Pain (1 - 3)  aluminum hydroxide/magnesium hydroxide/simethicone Suspension 30 milliLiter(s) Oral every 4 hours PRN Dyspepsia  clonazePAM  Tablet 0.5 milliGRAM(s) Oral two times a day PRN Anxiety  cloNIDine 0.1 milliGRAM(s) Oral every 6 hours PRN SBP >160  melatonin 3 milliGRAM(s) Oral at bedtime PRN Insomnia  ondansetron Injectable 4 milliGRAM(s) IV Push every 6 hours PRN Nausea and/or Vomiting  zolpidem 5 milliGRAM(s) Oral at bedtime PRN Insomnia    MEDICATIONS  (PRN):  acetaminophen     Tablet .. 650 milliGRAM(s) Oral every 6 hours PRN Mild Pain (1 - 3)  aluminum hydroxide/magnesium hydroxide/simethicone Suspension 30 milliLiter(s) Oral every 4 hours PRN Dyspepsia  clonazePAM  Tablet 0.5 milliGRAM(s) Oral two times a day PRN Anxiety  cloNIDine 0.1 milliGRAM(s) Oral every 6 hours PRN SBP >160  melatonin 3 milliGRAM(s) Oral at bedtime PRN Insomnia  ondansetron Injectable 4 milliGRAM(s) IV Push every 6 hours PRN Nausea and/or Vomiting  zolpidem 5 milliGRAM(s) Oral at bedtime PRN Insomnia    REVIEW OF SYSTEMS:  ===============================  ===============================  CONSTITUTIONAL: No weakness, fevers or chills  EYES/ENT: No visual changes;  No vertigo or throat pain   NECK: No pain or stiffness  RESPIRATORY: No cough, wheezing, hemoptysis; No shortness of breath  CARDIOVASCULAR: No chest pain or palpitations  GASTROINTESTINAL: No abdominal or epigastric pain. No nausea, vomiting, or hematemesis;   No diarrhea or constipation. No melena or hematochezia.  GENITOURINARY: No dysuria, frequency or hematuria  NEUROLOGICAL: No numbness or weakness  SKIN: No itching, burning, rashes, or lesions   All other review of systems is negative unless indicated above    Vital Signs Last 24 Hrs  T(C): 36.8 (12 Mar 2022 16:37), Max: 36.9 (12 Mar 2022 01:31)  T(F): 98.2 (12 Mar 2022 16:37), Max: 98.4 (12 Mar 2022 01:31)  HR: 75 (12 Mar 2022 16:37) (75 - 94)  BP: 142/94 (12 Mar 2022 16:37) (123/83 - 153/97)  BP(mean): 90 (12 Mar 2022 05:34) (90 - 133)  RR: 18 (12 Mar 2022 16:37) (16 - 20)  SpO2: 100% (12 Mar 2022 16:37) (96% - 100%)    I&O's Summary      I&O's Detail      PHYSICAL EXAM:    Constitutional: NAD, awake and alert, well-developed  HEENT: PERR, EOMI,  No oral cyananosis.  Neck:  supple,  No JVD  Respiratory: Breath sounds are clear bilaterally, No wheezing, rales or rhonchi  Cardiovascular: S1 and S2, regular rate and rhythm, no Murmurs, gallops or rubs  Gastrointestinal: Bowel Sounds present, soft, nontender.   Extremities: No peripheral edema. No clubbing or cyanosis.  Vascular: 2+ peripheral pulses  Neurological: A/O x 3, no focal deficits  Musculoskeletal: no calf tenderness.  Skin: No rashes.    ===============================  ===============================  LABS:                         14.3   9.59  )-----------( 261      ( 12 Mar 2022 02:27 )             42.9     12 Mar 2022 02:27    140    |  109    |  15     ----------------------------<  117    3.7     |  26     |  1.02     Ca    9.0        12 Mar 2022 02:27  Mg     2.4       12 Mar 2022 02:27    TPro  7.4    /  Alb  3.8    /  TBili  0.9    /  DBili  x      /  AST  21     /  ALT  38     /  AlkPhos  60     12 Mar 2022 02:27        THYROID STUDIES:    ===============================  ===============================  CARDIAC BIOMARKERS:  -BNP VALUES:      -TROPONIN VALUES:  -------  lab item   Troponin I, High Sensitivity Result: 9.14 ng/L (22 @ 04:34)  Troponin I, High Sensitivity Result: 6.95 ng/L (22 @ 02:27)  Troponin I, Serum: <0.015 ng/mL [0.015 - 0.045] (21 @ 11:43)    ===============================  ===============================  EKG: NSR, no ischemic changes.    TELE: normal sinus    ===============================  ECHO:  pending.  ===============================  CARDIAC CATH:    ===============================  STRESS TESTING:                                    ===============================    Billy Stevens M.D.  Cardiology, [facility]  Cell: [phone]  Office:   [phone] ([facility] Office)  [phone] ([facility] Office)      =================                       68 y/o f with h/o HTN, CKD, Gammopathy, on Olmesartan, Metoprolol and Amlodipine p/w left sided chest pain, headache, left retrobulbar pain and blurry vision out of the left eye with SBP > 200 at home. Patient states her blood pressure kept going up, she took her prn clonidine without relief, but then started developing left frontal headache with blurry vision. She denies any slurred speech or motor weakness with the event. She also noted to have some left sided chest pain which radiated to her arm. She reports having a history of migraines but denies any similar symptoms in the past. She is being admitted for further evaluation. Admitted for HTN emergency, headache w/ blurry vision.BPs in ED 160s, on tele in 120s-130s.Pt currently asymptomatic. Reviewed BP diary - BPs in 160s on evening prior to admitotherwise generally well controlled, 1x week runs higher in 150s.    3/13/22 Pt OOB to chair is very concerned that her b/p continues to be labile. Pt asymptomatic with two b/p's systolic >150, pt is stable at this time.     PAST MEDICAL AND SURGICAL HISTORY:  PAST MEDICAL & SURGICAL HISTORY:  Hypertension    Proteinuria    Essential hypertension  15  years    Gammopathy    Peritonitis    H/O dilation and curettage      History of colon surgery      H/O:   ,,    S/P colon resection    H/O ovarian cystectomy     delivery delivered  X3...., ,     S/P small bowel resection    Abdominal fistula    [place]    H/O ovarian cystectomy  each side, left removed, right 3/4 removed        ALLERGIES:  Allergies    sulfa drugs (Hives)  Sulfac 10% (Hives)    Intolerances        SOCIAL HISTORY:  Social History:  No ETD (12 Mar 2022 07:40)      FAMILY  HISTORY:  FAMILY HISTORY:  Family history of Parkinson&#x27;s disease (Mother)    Family history of heart disease (Father)        MEDICATIONS:  OUTPATIENT:  Home Medications:  amLODIPine 5 mg oral tablet: 1 tab(s) orally once a day (12 Mar 2022 07:44)  Imitrex 100 mg oral tablet: 1 tab(s) orally once (15 Feb 2017 10:38)  metoprolol succinate 25 mg oral tablet, extended release: 1 tab(s) orally once a day (12 Mar 2022 07:44)  olmesartan 40 mg oral tablet: 1 tab(s) orally once a day (12 Mar 2022 07:45)      INPATIENT:  MEDICATIONS  (STANDING):  amLODIPine   Tablet 5 milliGRAM(s) Oral daily  losartan 100 milliGRAM(s) Oral daily  metoprolol succinate ER 25 milliGRAM(s) Oral daily    MEDICATIONS  (PRN):  acetaminophen     Tablet .. 650 milliGRAM(s) Oral every 6 hours PRN Mild Pain (1 - 3)  aluminum hydroxide/magnesium hydroxide/simethicone Suspension 30 milliLiter(s) Oral every 4 hours PRN Dyspepsia  clonazePAM  Tablet 0.5 milliGRAM(s) Oral two times a day PRN Anxiety  cloNIDine 0.1 milliGRAM(s) Oral every 6 hours PRN SBP >160  melatonin 3 milliGRAM(s) Oral at bedtime PRN Insomnia  ondansetron Injectable 4 milliGRAM(s) IV Push every 6 hours PRN Nausea and/or Vomiting  zolpidem 5 milliGRAM(s) Oral at bedtime PRN Insomnia    MEDICATIONS  (PRN):  acetaminophen     Tablet .. 650 milliGRAM(s) Oral every 6 hours PRN Mild Pain (1 - 3)  aluminum hydroxide/magnesium hydroxide/simethicone Suspension 30 milliLiter(s) Oral every 4 hours PRN Dyspepsia  clonazePAM  Tablet 0.5 milliGRAM(s) Oral two times a day PRN Anxiety  cloNIDine 0.1 milliGRAM(s) Oral every 6 hours PRN SBP >160  melatonin 3 milliGRAM(s) Oral at bedtime PRN Insomnia  ondansetron Injectable 4 milliGRAM(s) IV Push every 6 hours PRN Nausea and/or Vomiting  zolpidem 5 milliGRAM(s) Oral at bedtime PRN Insomnia    REVIEW OF SYSTEMS:  ===============================  ===============================  CONSTITUTIONAL: No weakness, fevers or chills  EYES/ENT: No visual changes;  No vertigo or throat pain   NECK: No pain or stiffness  RESPIRATORY: No cough, wheezing, hemoptysis; No shortness of breath  CARDIOVASCULAR: No chest pain or palpitations  GASTROINTESTINAL: No abdominal or epigastric pain. No nausea, vomiting, or hematemesis;   No diarrhea or constipation. No melena or hematochezia.  GENITOURINARY: No dysuria, frequency or hematuria  NEUROLOGICAL: No numbness or weakness  SKIN: No itching, burning, rashes, or lesions   All other review of systems is negative unless indicated above    Vital Signs Last 24 Hrs  Vital Signs Last 24 Hrs  T(C): 36.6 (13 Mar 2022 08:35), Max: 36.8 (12 Mar 2022 16:37)  T(F): 97.9 (13 Mar 2022 08:35), Max: 98.2 (12 Mar 2022 16:37)  HR: 85 (13 Mar 2022 08:35) (75 - 102)  BP: 140/86 (13 Mar 2022 09:25) (112/82 - 176/104)  RR: 18 (13 Mar 2022 08:35) (16 - 18)  SpO2: 98% (13 Mar 2022 08:35) (98% - 100%)  I&O's Summary      I&O's Detail      PHYSICAL EXAM:    Constitutional: NAD, awake and alert, well-developed  HEENT: PERR, EOMI,  No oral cyananosis.  Neck:  supple,  No JVD  Respiratory: Breath sounds are clear bilaterally, No wheezing, rales or rhonchi  Cardiovascular: S1 and S2, regular rate and rhythm, no Murmurs, gallops or rubs  Gastrointestinal: Bowel Sounds present, soft, nontender.   Extremities: No peripheral edema. No clubbing or cyanosis.  Vascular: 2+ peripheral pulses  Neurological: A/O x 3, no focal deficits  Musculoskeletal: no calf tenderness.  Skin: No rashes.    ===============================  ===============================  LABS:                         14.6   7.08  )-----------( 266      ( 13 Mar 2022 07:52 )             44.3   03-13    139  |  110<H>  |  11  ----------------------------<  119<H>  3.4<L>   |  23  |  1.05    Ca    8.6      13 Mar 2022 07:52  Mg     2.4     03-12    TPro  7.4  /  Alb  3.7  /  TBili  1.3<H>  /  DBili  x   /  AST  28  /  ALT  33  /  AlkPhos  57  -13                          14.3   9.59  )-----------( 261      ( 12 Mar 2022 02:27 )             42.9     12 Mar 2022 02:27    140    |  109    |  15     ----------------------------<  117    3.7     |  26     |  1.02     Ca    9.0        12 Mar 2022 02:27  Mg     2.4       12 Mar 2022 02:27    TPro  7.4    /  Alb  3.8    /  TBili  0.9    /  DBili  x      /  AST  21     /  ALT  38     /  AlkPhos  60     12 Mar 2022 02:27        THYROID STUDIES:    ===============================  ===============================  CARDIAC BIOMARKERS:  -BNP VALUES:      -TROPONIN VALUES:  -------  lab item   Troponin I, High Sensitivity Result: 9.14 ng/L (22 @ 04:34)  Troponin I, High Sensitivity Result: 6.95 ng/L (22 @ 02:27)  Troponin I, Serum: <0.015 ng/mL [0.015 - 0.045] (21 @ 11:43)    ===============================  ===============================  EKG: NSR, no ischemic changes.    TELE: normal sinus    ===============================  ECHO:  pending   ===============================      =================

## 2022-03-13 NOTE — PROGRESS NOTE ADULT - PROBLEM SELECTOR PLAN 1
·  Problem: Hypertension.   ·  Recommendation:  Increase Amlodipine [id number] to 10mg daily for two episodes of systolic pressure >150  * Continue all other outpt medications  OK to d/c from cardiac standpoint w/ FU in cardio clinic in 1 week. Increase Amlodipine [id number] to 10mg daily for two episodes of systolic pressure >150  Continue all other outpt medications  Pt will stay one more day given persistent neurologic symptoms.

## 2022-03-13 NOTE — PATIENT PROFILE ADULT - FALL HARM RISK - UNIVERSAL INTERVENTIONS
Bed in lowest position, wheels locked, appropriate side rails in place/Call bell, personal items and telephone in reach/Instruct patient to call for assistance before getting out of bed or chair/Non-slip footwear when patient is out of bed/[place] to call system/Physically safe environment - no spills, clutter or unnecessary equipment/Purposeful Proactive Rounding/Room/bathroom lighting operational, light cord in reach

## 2022-03-14 ENCOUNTER — TRANSCRIPTION ENCOUNTER (OUTPATIENT)
Age: 67
End: 2022-03-14

## 2022-03-14 VITALS — HEART RATE: 65 BPM | RESPIRATION RATE: 18 BRPM | SYSTOLIC BLOOD PRESSURE: 118 MMHG | DIASTOLIC BLOOD PRESSURE: 76 MMHG

## 2022-03-14 LAB
ANION GAP SERPL CALC-SCNC: 5 MMOL/L — SIGNIFICANT CHANGE UP (ref 5–17)
BUN SERPL-MCNC: 12 MG/DL — SIGNIFICANT CHANGE UP (ref 7–23)
CALCIUM SERPL-MCNC: 9 MG/DL — SIGNIFICANT CHANGE UP (ref 8.5–10.1)
CHLORIDE SERPL-SCNC: 111 MMOL/L — HIGH (ref 96–108)
CO2 SERPL-SCNC: 23 MMOL/L — SIGNIFICANT CHANGE UP (ref 22–31)
CREAT SERPL-MCNC: 0.97 MG/DL — SIGNIFICANT CHANGE UP (ref 0.5–1.3)
CRP SERPL-MCNC: <3 MG/L — SIGNIFICANT CHANGE UP
EGFR: 64 ML/MIN/1.73M2 — SIGNIFICANT CHANGE UP
GLUCOSE SERPL-MCNC: 92 MG/DL — SIGNIFICANT CHANGE UP (ref 70–99)
HCT VFR BLD CALC: 42.9 % — SIGNIFICANT CHANGE UP (ref 34.5–45)
HGB BLD-MCNC: 14.6 G/DL — SIGNIFICANT CHANGE UP (ref 11.5–15.5)
MCHC RBC-ENTMCNC: 33.2 PG — SIGNIFICANT CHANGE UP (ref 27–34)
MCHC RBC-ENTMCNC: 34 GM/DL — SIGNIFICANT CHANGE UP (ref 32–36)
MCV RBC AUTO: 97.5 FL — SIGNIFICANT CHANGE UP (ref 80–100)
PLATELET # BLD AUTO: 266 K/UL — SIGNIFICANT CHANGE UP (ref 150–400)
POTASSIUM SERPL-MCNC: 4.2 MMOL/L — SIGNIFICANT CHANGE UP (ref 3.5–5.3)
POTASSIUM SERPL-SCNC: 4.2 MMOL/L — SIGNIFICANT CHANGE UP (ref 3.5–5.3)
RBC # BLD: 4.4 M/UL — SIGNIFICANT CHANGE UP (ref 3.8–5.2)
RBC # FLD: 12.5 % — SIGNIFICANT CHANGE UP (ref 10.3–14.5)
SODIUM SERPL-SCNC: 139 MMOL/L — SIGNIFICANT CHANGE UP (ref 135–145)
WBC # BLD: 7.25 K/UL — SIGNIFICANT CHANGE UP (ref 3.8–10.5)
WBC # FLD AUTO: 7.25 K/UL — SIGNIFICANT CHANGE UP (ref 3.8–10.5)

## 2022-03-14 PROCEDURE — 99233 SBSQ HOSP IP/OBS HIGH 50: CPT

## 2022-03-14 PROCEDURE — 99239 HOSP IP/OBS DSCHRG MGMT >30: CPT

## 2022-03-14 RX ORDER — ACETAMINOPHEN 500 MG
2 TABLET ORAL
Qty: 0 | Refills: 0 | DISCHARGE
Start: 2022-03-14

## 2022-03-14 RX ORDER — AMLODIPINE BESYLATE 2.5 MG/1
1 TABLET ORAL
Qty: 30 | Refills: 0
Start: 2022-03-14 | End: 2022-04-12

## 2022-03-14 RX ORDER — SUMATRIPTAN SUCCINATE 4 MG/.5ML
1 INJECTION, SOLUTION SUBCUTANEOUS
Qty: 0 | Refills: 0 | DISCHARGE

## 2022-03-14 RX ORDER — AMLODIPINE BESYLATE 2.5 MG/1
1 TABLET ORAL
Qty: 0 | Refills: 0 | DISCHARGE

## 2022-03-14 RX ORDER — METOPROLOL TARTRATE 50 MG
1 TABLET ORAL
Qty: 0 | Refills: 0 | DISCHARGE

## 2022-03-14 RX ORDER — PROPRANOLOL HCL 160 MG
1 CAPSULE, EXTENDED RELEASE 24HR ORAL
Qty: 30 | Refills: 0
Start: 2022-03-14 | End: 2022-04-12

## 2022-03-14 RX ORDER — CLONAZEPAM 1 MG
1 TABLET ORAL
Qty: 0 | Refills: 0 | DISCHARGE
Start: 2022-03-14

## 2022-03-14 NOTE — DISCHARGE NOTE PROVIDER - HOSPITAL COURSE
66 y/o female with  PMH of  HTN, CKD, Kidney Multiple Myeloma,   p/w left sided chest pain, headache, left retrobulbar pain and blurry vision out of the left eye with SBP > 200 at home. Patient stated  her blood pressure kept going up, she took her prn amlodipine without relief, but then started developing left frontal headache with blurry vision. She denied  any slurred speech or motor weakness with the event. She also noted to have some left sided chest pain which radiated to her arm. She reported  having a history of migraines but but not similar  symptoms, also didn't  have it for a long time.  In ED: /125, rest wnl, labs unremarkable, Trops neg.  CT head CTA head/neck was done and neg. Pt was admitted for further evaluation.  Monitored on tele: SR-ST HR 60s, episode of 110s. Pt was evaluated by cardio, CP likely due to HTN emergency. No further ischemic work up needed. BP meds adjusted, BP improved.   Also Pt was evaluated by neuro, MRI done, no CVA, no acute findings.  ESR/CRP checked and neg, unlikely GCA. HA and opthalmic pain is likely 2/2 elevated BP, also possibly migraine. Pts Metoprolol  changed  Inderal to prevent HA, also Pt may f/u with Dr Greenberg if ROLON recure.   Work up also notable for thyroid nodule, recommended to f/u with PCP for dedicated US   Pt was seen and examined today, reports feeling much better, no eye pain, no HA. BP stable. D/w Pt and spouse at bedside meds and outPt f/u. All  questions answered     Vital Signs Last 24 Hrs  T(C): 36.8 (14 Mar 2022 08:46), Max: 36.8 (14 Mar 2022 05:19)  T(F): 98.2 (14 Mar 2022 08:46), Max: 98.2 (14 Mar 2022 05:19)  HR: 65 (14 Mar 2022 09:58) (65 - 92)  BP: 118/76 (14 Mar 2022 09:58) (104/78 - 147/88)  RR: 18 (14 Mar 2022 09:58) (18 - 18)  SpO2: 98% (14 Mar 2022 08:46) (93% - 99%)    PHYSICAL EXAM:  General: Well developed; well nourished; in no acute distress  Eyes: PERRLA, EOMI; conjunctiva and sclera clear  Head: Normocephalic; atraumatic, no temporal tenderness to palpation   ENMT: No nasal discharge; airway clear  Neck: Supple; non tender; no masses  Respiratory: No wheezes, rales or rhonchi  Cardiovascular: Regular rate and rhythm. S1 and S2 Normal;   Gastrointestinal: Soft non-tender non-distended; Normal bowel sounds  Genitourinary: No  suprapubic  tenderness  Extremities: No  edema  Vascular: Peripheral pulses palpable 2+ bilaterally  Neurological: Alert and oriented x3,  no facial symmetry, speech is clear, MS 5/5   Skin: Warm and dry. No acute rash  Musculoskeletal: Normal muscle tone, without deformities  Psychiatric: Cooperative and appropriate    66 y/o f with h/o HTN on Olmesartan, Metoprolol and Amlodipine p/w left sided chest pain, headache, left retrobulbar pain and blurry vision out of the left eye with SBP > 200 at home.      # HTN emergency  - Blood pressure improved, stable  - C/w Amlodipine, increased daily dose to 10mg   - C/w Olmesartan 40  - C/w BB, changed to Inderal LA 120mg    - Pt is on Clonidine PRN if BP still  elevated   - EKG: no acute changes  - Trend troponin, Negative  -  TSH wnl     # Headache with blurry vision, resolved   - likely 2/2 elevated BP, but also possibly migraine   - Doubt TIA, CVA ruled out  - CTA/CT Head negative for acute findings   - MRI head neg for stroke   - Neuro checks  - add Fioricet PRN  - Change BB to Inderal LA 120mg QHS  - ESR/CRP  neg   - Neuro f/u outPt     # Anxiety  - Klonopin 0.5 mg BID Prn    # Insomnia   - Ambien prn     # CKD3.  Kidney MM  F/u with Dr Candelario     Dispo: stable for d/c home  Fax d/c summary to PCP  Total time 40 min

## 2022-03-14 NOTE — DISCHARGE NOTE NURSING/CASE MANAGEMENT/SOCIAL WORK - PATIENT PORTAL LINK FT
You can access the FollowMyHealth Patient Portal offered by [facility] by registering at the following website: [email or web address]. By joining [facility]’s FollowMyHealth portal, you will also be able to view your health information using other applications (apps) compatible with our system.

## 2022-03-14 NOTE — DISCHARGE NOTE PROVIDER - PROVIDER TOKENS
PROVIDER:[TOKEN:[[id number]],FOLLOWUP:[1 month]],PROVIDER:[TOKEN:[[id number]],FOLLOWUP:[1 week]],PROVIDER:[TOKEN:[[id number]],FOLLOWUP:[1 week]]

## 2022-03-14 NOTE — DISCHARGE NOTE PROVIDER - NSDCMRMEDTOKEN_GEN_ALL_CORE_FT
acetaminophen 325 mg oral tablet: 2 tab(s) orally every 6 hours, As needed, Mild Pain (1 - 3)  amLODIPine 10 mg oral tablet: 1 tab(s) orally once a day  clonazePAM 0.5 mg oral tablet: 1 tab(s) orally 2 times a day, As needed, Anxiety  cloNIDine 0.1 mg oral tablet: 1 tab(s) orally 12 times a day, As Needed  Imitrex 100 mg oral tablet: 1 tab(s) orally once, As Needed  olmesartan 40 mg oral tablet: 1 tab(s) orally once a day  propranolol 120 mg oral capsule, extended release: 1 cap(s) orally once a day (at bedtime)

## 2022-03-14 NOTE — DISCHARGE NOTE NURSING/CASE MANAGEMENT/SOCIAL WORK - NSDCPEFALRISK_GEN_ALL_CORE
For information on Fall & Injury Prevention, visit: [email or web address] OR  [email or web address] OR  https://www.cdc.gov/steadi/patient.html

## 2022-03-14 NOTE — DISCHARGE NOTE PROVIDER - CARE PROVIDERS DIRECT ADDRESSES
[email or web address]

## 2022-03-14 NOTE — PROGRESS NOTE ADULT - SUBJECTIVE AND OBJECTIVE BOX
68 y/o f with h/o HTN, CKD, Gammopathy, on Olmesartan, Metoprolol and Amlodipine p/w left sided chest pain, headache, left retrobulbar pain and blurry vision out of the left eye with SBP > 200 at home. Patient states her blood pressure kept going up, she took her prn clonidine without relief, but then started developing left frontal headache with blurry vision. She denies any slurred speech or motor weakness with the event. She also noted to have some left sided chest pain which radiated to her arm. She reports having a history of migraines but denies any similar symptoms in the past. She is being admitted for further evaluation. Admitted for HTN emergency, headache w/ blurry vision.BPs in ED 160s, on tele in 120s-130s.Pt currently asymptomatic. Reviewed BP diary - BPs in 160s on evening prior to admitotherwise generally well controlled, 1x week runs higher in 150s.    3/13/22 Pt OOB to chair is very concerned that her b/p continues to be labile. Pt asymptomatic with two b/p's systolic >150, pt is stable at this time.   3/14/'22: no complaints.  BP controlled.      03-12 @ 04:34  TSH: 0.90    MEDICATIONS:  OUTPATIENT  Home Medications:  acetaminophen 325 mg oral tablet: 2 tab(s) orally every 6 hours, As needed, Mild Pain (1 - 3) (14 Mar 2022 13:49)  clonazePAM 0.5 mg oral tablet: 1 tab(s) orally 2 times a day, As needed, Anxiety (14 Mar 2022 13:49)  cloNIDine 0.1 mg oral tablet: 1 tab(s) orally 12 times a day, As Needed (14 Mar 2022 13:49)  Imitrex 100 mg oral tablet: 1 tab(s) orally once, As Needed (14 Mar 2022 13:49)  olmesartan 40 mg oral tablet: 1 tab(s) orally once a day (12 Mar 2022 07:45)      INPATIENT  MEDICATIONS  (STANDING):  amLODIPine   Tablet 10 milliGRAM(s) Oral daily  enoxaparin Injectable 40 milliGRAM(s) SubCutaneous every 24 hours  losartan 100 milliGRAM(s) Oral daily  propranolol  milliGRAM(s) Oral at bedtime    MEDICATIONS  (PRN):  acetaminophen     Tablet .. 650 milliGRAM(s) Oral every 6 hours PRN Mild Pain (1 - 3)  acetaminophen 325 mG/butalbital 50 mG/caffeine 40 mG 1 Tablet(s) Oral every 8 hours PRN headache  aluminum hydroxide/magnesium hydroxide/simethicone Suspension 30 milliLiter(s) Oral every 4 hours PRN Dyspepsia  clonazePAM  Tablet 0.5 milliGRAM(s) Oral two times a day PRN Anxiety  cloNIDine 0.1 milliGRAM(s) Oral every 6 hours PRN SBP >160  melatonin 3 milliGRAM(s) Oral at bedtime PRN Insomnia  ondansetron Injectable 4 milliGRAM(s) IV Push every 6 hours PRN Nausea and/or Vomiting  zolpidem 5 milliGRAM(s) Oral at bedtime PRN Insomnia          Vital Signs Last 24 Hrs  T(C): 36.8 (14 Mar 2022 08:46), Max: 36.8 (14 Mar 2022 05:19)  T(F): 98.2 (14 Mar 2022 08:46), Max: 98.2 (14 Mar 2022 05:19)  HR: 65 (14 Mar 2022 09:58) (65 - 92)  BP: 118/76 (14 Mar 2022 09:58) (104/78 - 147/88)  BP(mean): --  RR: 18 (14 Mar 2022 09:58) (18 - 18)  SpO2: 98% (14 Mar 2022 08:46) (93% - 99%)Daily     Daily I&O's Summary      I&O's Detail      I&O's Summary      PHYSICAL EXAM:    Constitutional: NAD, awake and alert, well-developed  HEENT: PERR, EOMI,  No oral cyanosis.  Neck:  supple,  No JVD  Respiratory: Breath sounds are clear bilaterally, No wheezing, rales or rhonchi  Cardiovascular: S1 and S2, regular rate and rhythm, no Murmurs, gallops or rubs  Gastrointestinal: Bowel Sounds present, soft, nontender.   Extremities: No peripheral edema. No clubbing or cyanosis.  Vascular: 2+ peripheral pulses  Neurological: A/O x 3, no focal deficits  Musculoskeletal: no calf tenderness.  Skin: No rashes.      ===============================  ===============================  LABS:                         14.6   7.25  )-----------( 266      ( 14 Mar 2022 11:17 )             42.9                         14.6   7.08  )-----------( 266      ( 13 Mar 2022 07:52 )             44.3                         14.3   9.59  )-----------( 261      ( 12 Mar 2022 02:27 )             42.9     14 Mar 2022 11:17    139    |  111    |  12     ----------------------------<  92     4.2     |  23     |  0.97   13 Mar 2022 07:52    139    |  110    |  11     ----------------------------<  119    3.4     |  23     |  1.05   12 Mar 2022 02:27    140    |  109    |  15     ----------------------------<  117    3.7     |  26     |  1.02     Ca    9.0        14 Mar 2022 11:17  Ca    8.6        13 Mar 2022 07:52  Ca    9.0        12 Mar 2022 02:27  Mg     2.4       12 Mar 2022 02:27    TPro  7.4    /  Alb  3.7    /  TBili  1.3    /  DBili  x      /  AST  28     /  ALT  33     /  AlkPhos  57     13 Mar 2022 07:52  TPro  7.4    /  Alb  3.8    /  TBili  0.9    /  DBili  x      /  AST  21     /  ALT  38     /  AlkPhos  60     12 Mar 2022 02:27      ===============================  ===============================  CARDIAC BIOMARKERS:  BNP      TROPONIN  Troponin I, High Sensitivity Result: 9.14 ng/L (03-12-22 @ 04:34)  Troponin I, High Sensitivity Result: 6.95 ng/L (03-12-22 @ 02:27)  Troponin I, Serum: <0.015 ng/mL [0.015 - 0.045] (03-24-21 @ 11:43)    ===============================  ===============================  BLOOD CULTURES:    Blood Culture:     03-12 @ 04:34  TSH: 0.90    ===============================    Billy Stevens M.D.  Cardiology, [facility]  Cell: [phone]  Offices:   [phone] ([facility] Office)  [phone] ([facility] Office)

## 2022-03-14 NOTE — DISCHARGE NOTE PROVIDER - CARE PROVIDER_API CALL
Rigoberto Greenberg)  Internal Medicine; Neurology  [address], [address]  [place] [address]  Phone: [phone]  Fax: [phone]  Follow Up Time: 1 month    Lokesh Mckeon)  Cardiovascular Disease  [address], [address]  [place] [address]  Phone: [phone]  Fax: [phone]  Follow Up Time: 1 week    Zeinab Aguayo ()  Internal Medicine  [address] [place] [address]  Phone: [phone]  Fax: [phone]  Follow Up Time: 1 week

## 2022-03-14 NOTE — DISCHARGE NOTE PROVIDER - NSDCCPCAREPLAN_GEN_ALL_CORE_FT
PRINCIPAL DISCHARGE DIAGNOSIS  Diagnosis: Hypertensive emergency  Assessment and Plan of Treatment: BP improved and stable  C/w Olmesartan, Amlodipine increased to 10mg daily, metoprolol was changed to Inderal 120mg daily   Clonidine 0.1mg as needed for SBP>180, call cardio   F/u with Dr Mckeon within 1 week      SECONDARY DISCHARGE DIAGNOSES  Diagnosis: HA (headache)  Assessment and Plan of Treatment: with blurry vision and eye pain, resolved   likely 2/2 elevated BP or possibly  due to migraine  C/w Inderal, Imitrex as needd  F/u with Dr Greenberg if recure    Diagnosis: Stage 3 chronic kidney disease  Assessment and Plan of Treatment: F/u with Dr Candelario as scheduled

## 2022-03-14 NOTE — PROGRESS NOTE ADULT - PROBLEM SELECTOR PLAN 1
Cont. Amlodipine at 10mg daily.  D/c on hydralazine 25 po PRN SBPs> 160s  OK to d/c followup w/ Dr. Mckeon in 1 week.

## 2022-03-16 DIAGNOSIS — H53.8 OTHER VISUAL DISTURBANCES: ICD-10-CM

## 2022-03-16 DIAGNOSIS — I16.1 HYPERTENSIVE EMERGENCY: ICD-10-CM

## 2022-03-16 DIAGNOSIS — Z88.2 ALLERGY STATUS TO SULFONAMIDES: ICD-10-CM

## 2022-03-16 DIAGNOSIS — I12.9 HYPERTENSIVE CHRONIC KIDNEY DISEASE WITH STAGE 1 THROUGH STAGE 4 CHRONIC KIDNEY DISEASE, OR UNSPECIFIED CHRONIC KIDNEY DISEASE: ICD-10-CM

## 2022-03-16 DIAGNOSIS — G43.909 MIGRAINE, UNSPECIFIED, NOT INTRACTABLE, WITHOUT STATUS MIGRAINOSUS: ICD-10-CM

## 2022-03-16 DIAGNOSIS — F41.9 ANXIETY DISORDER, UNSPECIFIED: ICD-10-CM

## 2022-03-16 DIAGNOSIS — N18.30 CHRONIC KIDNEY DISEASE, STAGE 3 UNSPECIFIED: ICD-10-CM

## 2022-03-16 DIAGNOSIS — D47.2 MONOCLONAL GAMMOPATHY: ICD-10-CM

## 2022-03-16 DIAGNOSIS — G47.00 INSOMNIA, UNSPECIFIED: ICD-10-CM

## 2022-03-25 ENCOUNTER — NON-APPOINTMENT (OUTPATIENT)
Age: 67
End: 2022-03-25

## 2022-03-25 ENCOUNTER — APPOINTMENT (OUTPATIENT)
Dept: CARDIOLOGY | Facility: CLINIC | Age: 67
End: 2022-03-25
Payer: MEDICARE

## 2022-03-25 VITALS
HEART RATE: 56 BPM | BODY MASS INDEX: 28.35 KG/M2 | HEIGHT: 63 IN | OXYGEN SATURATION: 100 % | SYSTOLIC BLOOD PRESSURE: 110 MMHG | DIASTOLIC BLOOD PRESSURE: 80 MMHG | WEIGHT: 160 LBS

## 2022-03-25 PROCEDURE — 99214 OFFICE O/P EST MOD 30 MIN: CPT

## 2022-03-25 PROCEDURE — 93000 ELECTROCARDIOGRAM COMPLETE: CPT

## 2022-03-25 RX ORDER — METOPROLOL SUCCINATE 50 MG/1
50 TABLET, EXTENDED RELEASE ORAL
Qty: 90 | Refills: 3 | Status: DISCONTINUED | COMMUNITY
Start: 2021-05-27 | End: 2022-03-25

## 2022-03-25 NOTE — HISTORY OF PRESENT ILLNESS
[FreeTextEntry1] : 66 yo female presents for evaluation for hypertension. Pt was recently discharged from  for elevated blood pressure. ?migraine. Pt was started on medications and they were reviewed. Hospitalization was discussed. Pt denies further symptoms. BP is adequate today.  present

## 2022-03-25 NOTE — DISCUSSION/SUMMARY
[FreeTextEntry1] : Pt will continue meds as prescribed and taken to reduce peaks and valleys of blood pressure. Pt will continue watch salt in diet. ECG to evaluate for ischemia. Pt will follow up in 4 months. Pt will follow up with Neuro.

## 2022-04-02 ENCOUNTER — RX RENEWAL (OUTPATIENT)
Age: 67
End: 2022-04-02

## 2022-04-02 RX ORDER — PROPRANOLOL HYDROCHLORIDE 120 MG/1
120 CAPSULE, EXTENDED RELEASE ORAL AT BEDTIME
Qty: 90 | Refills: 3 | Status: ACTIVE | COMMUNITY
Start: 2022-04-02 | End: 1900-01-01

## 2022-06-30 ENCOUNTER — APPOINTMENT (OUTPATIENT)
Dept: CARDIOLOGY | Facility: CLINIC | Age: 67
End: 2022-06-30

## 2022-06-30 VITALS
WEIGHT: 163.58 LBS | SYSTOLIC BLOOD PRESSURE: 116 MMHG | HEIGHT: 63 IN | HEART RATE: 67 BPM | DIASTOLIC BLOOD PRESSURE: 73 MMHG | OXYGEN SATURATION: 98 % | BODY MASS INDEX: 28.98 KG/M2

## 2022-06-30 PROCEDURE — 99214 OFFICE O/P EST MOD 30 MIN: CPT

## 2022-06-30 PROCEDURE — 93000 ELECTROCARDIOGRAM COMPLETE: CPT

## 2022-06-30 RX ORDER — ROSUVASTATIN CALCIUM 10 MG/1
10 TABLET, FILM COATED ORAL
Qty: 90 | Refills: 3 | Status: DISCONTINUED | COMMUNITY
End: 2022-06-30

## 2022-06-30 NOTE — HISTORY OF PRESENT ILLNESS
[FreeTextEntry1] : 66 yo female presents for evaluation for hypertension. Pt is present with her . She has been keeping diligent records of her blood pressures. She has made adjustments in timing with her propanolol, taking it earlier in the evening to avoid morning systolic pressures less than 100. She has been taking an extra dose of amlodipine to reduce pressures >160 throughout the day.

## 2022-06-30 NOTE — DISCUSSION/SUMMARY
[FreeTextEntry1] : Pt will continue meds as prescribed and taken to reduce peaks and valleys of blood pressure. Pt will continue watch salt in diet. ECG to evaluate for ischemia. Pt will follow up in 2 months. Pt will continue to record her blood pressure with the goal of eliminating prn bp medications.

## 2022-06-30 NOTE — PHYSICAL EXAM
[General Appearance - Well Developed] : well developed [Normal Appearance] : normal appearance [Well Groomed] : well groomed [General Appearance - Well Nourished] : well nourished [No Deformities] : no deformities [General Appearance - In No Acute Distress] : no acute distress [Normal Conjunctiva] : the conjunctiva exhibited no abnormalities [Eyelids - No Xanthelasma] : the eyelids demonstrated no xanthelasmas [Normal Oral Mucosa] : normal oral mucosa [No Oral Pallor] : no oral pallor [No Oral Cyanosis] : no oral cyanosis [Normal Jugular Venous A Waves Present] : normal jugular venous A waves present [Normal Jugular Venous V Waves Present] : normal jugular venous V waves present [No Jugular Venous Patino A Waves] : no jugular venous patino A waves [Heart Rate And Rhythm] : heart rate and rhythm were normal [Heart Sounds] : normal S1 and S2 [Murmurs] : no murmurs present [Respiration, Rhythm And Depth] : normal respiratory rhythm and effort [Auscultation Breath Sounds / Voice Sounds] : lungs were clear to auscultation bilaterally [Exaggerated Use Of Accessory Muscles For Inspiration] : no accessory muscle use [Abdomen Soft] : soft [Abdomen Tenderness] : non-tender [Abdomen Mass (___ Cm)] : no abdominal mass palpated [Abnormal Walk] : normal gait [Gait - Sufficient For Exercise Testing] : the gait was sufficient for exercise testing [Nail Clubbing] : no clubbing of the fingernails [Cyanosis, Localized] : no localized cyanosis [Petechial Hemorrhages (___cm)] : no petechial hemorrhages [Skin Color & Pigmentation] : normal skin color and pigmentation [] : no rash [No Venous Stasis] : no venous stasis [Skin Lesions] : no skin lesions [No Skin Ulcers] : no skin ulcer [No Xanthoma] : no  xanthoma was observed [Oriented To Time, Place, And Person] : oriented to person, place, and time [Affect] : the affect was normal [Mood] : the mood was normal [No Anxiety] : not feeling anxious

## 2022-09-08 NOTE — ED PROVIDER NOTE - NS_BEDUNITTYPES_ED_ALL_ED
Patient has given me verbal consent to perform catheter placement  Yes    Does patient have latex allergy? No  Does patient have shellfish or betadine allergy? No      Following Dr. Bianca Newell of [facility]. Changed 16 Fr suprapubic Catheter without difficulty. Once balloon was deflated, removed catheter without difficulty. Cleansed suprapubic opening with betadine swab. 16 Fr regular stearns was inserted using sterile water-soluble lubricant without difficulty. Catheter was flushed with 35 cc water insuring return and inflated balloon with 10 ml of water. Stearns Catheter was hooked up to leg bag with straps. Patient advised to make sure they do not pull on catheter. Pulling may cause pain and bleeding from sp site. Supplies were provided by office      Bard order given?   No TELEMETRY

## 2022-10-07 ENCOUNTER — NON-APPOINTMENT (OUTPATIENT)
Age: 67
End: 2022-10-07

## 2022-10-07 ENCOUNTER — APPOINTMENT (OUTPATIENT)
Dept: CARDIOLOGY | Facility: CLINIC | Age: 67
End: 2022-10-07

## 2022-10-07 VITALS
HEART RATE: 97 BPM | DIASTOLIC BLOOD PRESSURE: 62 MMHG | HEIGHT: 63 IN | BODY MASS INDEX: 29.38 KG/M2 | SYSTOLIC BLOOD PRESSURE: 106 MMHG | OXYGEN SATURATION: 63 % | WEIGHT: 165.78 LBS

## 2022-10-07 DIAGNOSIS — Z00.00 ENCOUNTER FOR GENERAL ADULT MEDICAL EXAMINATION W/OUT ABNORMAL FINDINGS: ICD-10-CM

## 2022-10-07 PROCEDURE — 93000 ELECTROCARDIOGRAM COMPLETE: CPT

## 2022-10-07 PROCEDURE — 99214 OFFICE O/P EST MOD 30 MIN: CPT | Mod: 25

## 2022-10-07 RX ORDER — CLONAZEPAM 0.5 MG/1
0.5 TABLET ORAL DAILY
Refills: 0 | Status: ACTIVE | COMMUNITY

## 2022-10-07 RX ORDER — ROSUVASTATIN CALCIUM 10 MG/1
10 TABLET, FILM COATED ORAL DAILY
Qty: 90 | Refills: 1 | Status: ACTIVE | COMMUNITY

## 2022-10-07 NOTE — DISCUSSION/SUMMARY
[FreeTextEntry1] : Pt will continue meds as prescribed. Pt's blood pressure is improved. Pt will have labs to assess CPK because of muscle pain. She was told to take COQ10 daily. Pt will follow in 6 months. She will follow up  with PMD.

## 2022-10-07 NOTE — HISTORY OF PRESENT ILLNESS
[FreeTextEntry1] : 66 yo female presents for evaluation for hypertension. . She has been keeping diligent records of her blood pressures. Pt has improved control of blood pressures. She complains of leg pain and back pain. She is concerned that it may be related to statins. Medical results were reviewed regarding thyroid nodules. No chest pain or shortness of breath.

## 2022-11-02 NOTE — REASON FOR VISIT
[Symptom and Test Evaluation] : symptom and test evaluation [Arrhythmia/ECG Abnorrmalities] : arrhythmia/ECG abnormalities [Initial Evaluation] : an initial evaluation of [Hypertension] : hypertension MED/SURG

## 2022-12-19 ENCOUNTER — APPOINTMENT (OUTPATIENT)
Dept: OBGYN | Facility: CLINIC | Age: 67
End: 2022-12-19

## 2022-12-19 VITALS
WEIGHT: 165 LBS | BODY MASS INDEX: 29.23 KG/M2 | DIASTOLIC BLOOD PRESSURE: 70 MMHG | SYSTOLIC BLOOD PRESSURE: 122 MMHG | HEIGHT: 63 IN

## 2022-12-19 DIAGNOSIS — Z86.03 PERSONAL HISTORY OF NEOPLASM OF UNCERTAIN BEHAVIOR: ICD-10-CM

## 2022-12-19 DIAGNOSIS — N05.8 UNSPECIFIED NEPHRITIC SYNDROME WITH OTHER MORPHOLOGIC CHANGES: ICD-10-CM

## 2022-12-19 PROCEDURE — 99397 PER PM REEVAL EST PAT 65+ YR: CPT

## 2022-12-19 NOTE — HISTORY OF PRESENT ILLNESS
[postmenopausal] : postmenopausal [N] : Patient does not use contraception [Y] : Positive pregnancy history [Menarche Age: ____] : age at menarche was [unfilled] [Mammogramdate] : 9/22/2022 [TextBox_19] : B1 [PapSmeardate] : 12/13/2021 [TextBox_31] : WNL [HPVDate] : 12/13/2021 [TextBox_78] : NEG [LMPDate] : 2001 [PGHxTotal] : 4 [[facility]Term] : 3 [[facility] Hospitaliving] : 3 [FreeTextEntry1] : 2001

## 2022-12-19 NOTE — DISCUSSION/SUMMARY
[FreeTextEntry1] : 68 y/o postmenopausal  (LMP ) presenting for annual visit, no complaints today\par - Pap and HPV performed today given recurrent inflammatory changes on past paps\par - Hx of ovarian cyst, stable and simple in 2019, no symptoms and no masses on exam at recent annuals, normal exam today\par - Pt reports recent PET CT for MGUS where ovaries were also normal; will work on obtaining results to confirm. No further follow up of past cyst needed at this time\par - Last colonoscopy in , due in  per pt\par - Has osteoporosis, follows with endocrinology, DEXA scans up to date\par - Mammogram from [address] ([facility] radiology)\par - Follow up in 1 year for annual exam or sooner if issue arises\par \par Klever Jamison MD

## 2022-12-21 ENCOUNTER — NON-APPOINTMENT (OUTPATIENT)
Age: 67
End: 2022-12-21

## 2022-12-21 LAB — HPV HIGH+LOW RISK DNA PNL CVX: NOT DETECTED

## 2023-01-03 LAB — CYTOLOGY CVX/VAG DOC THIN PREP: NORMAL

## 2023-01-04 ENCOUNTER — NON-APPOINTMENT (OUTPATIENT)
Age: 68
End: 2023-01-04

## 2023-05-10 LAB
ALBUMIN SERPL ELPH-MCNC: 4.3 G/DL
ALP BLD-CCNC: 53 U/L
ALT SERPL-CCNC: 19 U/L
ANION GAP SERPL CALC-SCNC: 12 MMOL/L
AST SERPL-CCNC: 20 U/L
BASOPHILS # BLD AUTO: 0.05 K/UL
BASOPHILS NFR BLD AUTO: 0.9 %
BILIRUB SERPL-MCNC: 0.7 MG/DL
BUN SERPL-MCNC: 17 MG/DL
CALCIUM SERPL-MCNC: 8.5 MG/DL
CHLORIDE SERPL-SCNC: 109 MMOL/L
CHOLEST SERPL-MCNC: 160 MG/DL
CK SERPL-CCNC: 65 U/L
CO2 SERPL-SCNC: 22 MMOL/L
CREAT SERPL-MCNC: 1.14 MG/DL
EGFR: 52 ML/MIN/1.73M2
EOSINOPHIL # BLD AUTO: 0.18 K/UL
EOSINOPHIL NFR BLD AUTO: 3.2 %
GLUCOSE SERPL-MCNC: 103 MG/DL
HCT VFR BLD CALC: 42.6 %
HDLC SERPL-MCNC: 40 MG/DL
HGB BLD-MCNC: 13.7 G/DL
IMM GRANULOCYTES NFR BLD AUTO: 0 %
LDLC SERPL CALC-MCNC: 76 MG/DL
LYMPHOCYTES # BLD AUTO: 1.78 K/UL
LYMPHOCYTES NFR BLD AUTO: 31.7 %
MAN DIFF?: NORMAL
MCHC RBC-ENTMCNC: 32.2 GM/DL
MCHC RBC-ENTMCNC: 32.2 PG
MCV RBC AUTO: 100 FL
MONOCYTES # BLD AUTO: 0.58 K/UL
MONOCYTES NFR BLD AUTO: 10.3 %
NEUTROPHILS # BLD AUTO: 3.02 K/UL
NEUTROPHILS NFR BLD AUTO: 53.9 %
NONHDLC SERPL-MCNC: 120 MG/DL
PLATELET # BLD AUTO: 251 K/UL
POTASSIUM SERPL-SCNC: 4.4 MMOL/L
PROT SERPL-MCNC: 6.5 G/DL
RBC # BLD: 4.26 M/UL
RBC # FLD: 12.1 %
SODIUM SERPL-SCNC: 143 MMOL/L
TRIGL SERPL-MCNC: 218 MG/DL
WBC # FLD AUTO: 5.61 K/UL

## 2023-05-15 ENCOUNTER — APPOINTMENT (OUTPATIENT)
Dept: CARDIOLOGY | Facility: CLINIC | Age: 68
End: 2023-05-15
Payer: MEDICARE

## 2023-05-15 ENCOUNTER — NON-APPOINTMENT (OUTPATIENT)
Age: 68
End: 2023-05-15

## 2023-05-15 VITALS
HEART RATE: 66 BPM | HEIGHT: 63 IN | DIASTOLIC BLOOD PRESSURE: 86 MMHG | OXYGEN SATURATION: 96 % | WEIGHT: 165 LBS | BODY MASS INDEX: 29.23 KG/M2 | SYSTOLIC BLOOD PRESSURE: 121 MMHG | RESPIRATION RATE: 16 BRPM

## 2023-05-15 PROCEDURE — 99214 OFFICE O/P EST MOD 30 MIN: CPT | Mod: 25

## 2023-05-15 PROCEDURE — 93000 ELECTROCARDIOGRAM COMPLETE: CPT

## 2023-05-15 NOTE — HISTORY OF PRESENT ILLNESS
[FreeTextEntry1] : 69 yo female presents for evaluation for hypertension. . Pt has been trying to exercise. She has gained weight. Pt has been noticing elevated blood pressures in the late afternoon . Pt denies chest pain or shortness of breath.

## 2023-05-15 NOTE — DISCUSSION/SUMMARY
[FreeTextEntry1] : Pt will continue meds as prescribed. Pt will lose weight, exercise. She will follow up in 6 months.  [EKG obtained to assist in diagnosis and management of assessed problem(s)] : EKG obtained to assist in diagnosis and management of assessed problem(s)

## 2023-08-14 NOTE — ED PROVIDER NOTE - AXIS
Consider add daily MVI for micronutrient coverage. Encourage po intake as tolerated and honor food preferences PRN. Monitor PO intake/tolerance, weights, labs, BM's, and skin integrity. 
Normal

## 2023-11-09 ENCOUNTER — APPOINTMENT (OUTPATIENT)
Dept: CARDIOLOGY | Facility: CLINIC | Age: 68
End: 2023-11-09
Payer: MEDICARE

## 2023-11-09 ENCOUNTER — NON-APPOINTMENT (OUTPATIENT)
Age: 68
End: 2023-11-09

## 2023-11-09 VITALS
BODY MASS INDEX: 30.12 KG/M2 | HEART RATE: 72 BPM | OXYGEN SATURATION: 96 % | HEIGHT: 63 IN | WEIGHT: 170 LBS | DIASTOLIC BLOOD PRESSURE: 68 MMHG | SYSTOLIC BLOOD PRESSURE: 122 MMHG

## 2023-11-09 PROCEDURE — 93000 ELECTROCARDIOGRAM COMPLETE: CPT

## 2023-11-09 PROCEDURE — 99214 OFFICE O/P EST MOD 30 MIN: CPT | Mod: 25

## 2023-11-09 RX ORDER — PHENAZOPYRIDINE HYDROCHLORIDE 200 MG/1
200 TABLET ORAL
Qty: 6 | Refills: 0 | Status: DISCONTINUED | COMMUNITY
Start: 2023-04-01 | End: 2023-11-09

## 2023-11-09 RX ORDER — CLONIDINE HYDROCHLORIDE 0.1 MG/1
0.1 TABLET ORAL
Qty: 30 | Refills: 0 | Status: DISCONTINUED | COMMUNITY
Start: 2023-02-15 | End: 2023-11-09

## 2023-11-09 RX ORDER — AMLODIPINE BESYLATE 10 MG/1
10 TABLET ORAL
Qty: 90 | Refills: 0 | Status: DISCONTINUED | COMMUNITY
Start: 2022-04-11 | End: 2023-11-09

## 2023-11-09 RX ORDER — CEFDINIR 300 MG/1
300 CAPSULE ORAL
Qty: 10 | Refills: 0 | Status: DISCONTINUED | COMMUNITY
Start: 2023-04-05 | End: 2023-11-09

## 2023-11-09 RX ORDER — NITROFURANTOIN (MONOHYDRATE/MACROCRYSTALS) 25; 75 MG/1; MG/1
100 CAPSULE ORAL
Qty: 10 | Refills: 0 | Status: DISCONTINUED | COMMUNITY
Start: 2023-04-01 | End: 2023-11-09

## 2023-11-09 RX ORDER — CIPROFLOXACIN HYDROCHLORIDE 250 MG/1
250 TABLET, FILM COATED ORAL
Qty: 14 | Refills: 0 | Status: DISCONTINUED | COMMUNITY
Start: 2023-04-18 | End: 2023-11-09

## 2023-12-21 ENCOUNTER — APPOINTMENT (OUTPATIENT)
Dept: OBGYN | Facility: CLINIC | Age: 68
End: 2023-12-21
Payer: MEDICARE

## 2023-12-21 ENCOUNTER — NON-APPOINTMENT (OUTPATIENT)
Age: 68
End: 2023-12-21

## 2023-12-21 VITALS
WEIGHT: 174 LBS | SYSTOLIC BLOOD PRESSURE: 120 MMHG | BODY MASS INDEX: 30.83 KG/M2 | DIASTOLIC BLOOD PRESSURE: 80 MMHG | HEIGHT: 63 IN

## 2023-12-21 DIAGNOSIS — Z87.42 PERSONAL HISTORY OF OTHER DISEASES OF THE FEMALE GENITAL TRACT: ICD-10-CM

## 2023-12-21 DIAGNOSIS — Z01.419 ENCOUNTER FOR GYNECOLOGICAL EXAMINATION (GENERAL) (ROUTINE) W/OUT ABNORMAL FINDINGS: ICD-10-CM

## 2023-12-21 PROCEDURE — 99397 PER PM REEVAL EST PAT 65+ YR: CPT

## 2023-12-23 PROBLEM — Z01.419 ENCOUNTER FOR ANNUAL ROUTINE GYNECOLOGICAL EXAMINATION: Status: ACTIVE | Noted: 2020-12-01

## 2023-12-23 PROBLEM — Z87.42 HISTORY OF OVARIAN CYST: Status: RESOLVED | Noted: 2019-05-16 | Resolved: 2023-12-23

## 2023-12-23 NOTE — HISTORY OF PRESENT ILLNESS
[N] : Patient does not use contraception [Y] : Positive pregnancy history [Menarche Age: ____] : age at menarche was [unfilled] [No] : Patient does not have concerns regarding sex [Currently Active] : currently active [Patient reported mammogram was normal] : Patient reported mammogram was normal [Patient reported breast sonogram was normal] : Patient reported breast sonogram was normal [Patient reported colonoscopy was normal] : Patient reported colonoscopy was normal [Mammogramdate] : 9/26/23 [BreastSonogramDate] : 9/26/23 [TextBox_31] : NEG [PapSmeardate] : 12/19/22 [ColonoscopyDate] : 12/11/2023 [TextBox_43] : amelia [HPVDate] : 12/19/22 [TextBox_78] : NEG [LMPDate] : 2001 [[facility] HospitallTerm] : 3 [PGHxTotal] : 4 [[facility]iving] : 3 [FreeTextEntry1] : 2001

## 2023-12-23 NOTE — RESULTS/DATA
[TextEntry] : Date of Exam: 09-   EXAM: DIGITAL BILATERAL SCREENING MAMMOGRAM WITH CAD AND TOMOSYNTHESIS AND BREAST ULTRASOUND  HISTORY: The patient is 68 years old and is seen for a screening mammogram. There is no personal history of breast cancer. Family history of breast cancer: None.  CLINICAL BREAST EXAMINATION: The patient reports that her last clinical breast exam was within the past year.   COMPARISON: The present examination has been compared to prior breast imaging studies dating back to 9/20/2019.  MAMMOGRAM:  TECHNIQUE: The following views were obtained digitally: bilateral craniocaudal, bilateral mediolateral oblique. Low-dose full-field digital breast tomosynthesis examination was performed with tomosynthesis acquisitions and synthesized 2D reconstructed mammogram. Computer-aided detection was applied.  FINDINGS:  BREAST COMPOSITION: There are scattered areas of fibroglandular density.  No suspicious masses, architectural distortion, or significant calcifications are detected.  BREAST ULTRASOUND:  HISTORY: Supplemental screening evaluation.   TECHNIQUE: A bilateral breast ultrasound was performed with complete evaluation of the four quadrants, retroareolar region, and axilla.   FINDINGS:  BREAST ECHOTEXTURE: There is a homogeneous background echotexture - fibroglandular.   No suspicious solid or complex cystic mass is detected in either breast. There is no lymphadenopathy in the axillae.  IMPRESSION: No mammographic or ultrasonographic evidence of malignancy. No significant change.   FOLLOW-UP: Follow-up imaging in 12 months.   ASSESSMENT: BI-RADS Category 1:  Negative.

## 2023-12-23 NOTE — PHYSICAL EXAM
[Chaperone Present] : A chaperone was present in the examining room during all aspects of the physical examination [Appropriately responsive] : appropriately responsive [Alert] : alert [No Acute Distress] : no acute distress [Oriented x3] : oriented x3 [Examination Of The Breasts] : a normal appearance [No Masses] : no breast masses were palpable [Labia Majora] : normal [Labia Minora] : normal [Atrophy] : atrophy [Normal] : normal [Uterine Adnexae] : normal

## 2023-12-23 NOTE — DISCUSSION/SUMMARY
[FreeTextEntry1] : 67 y/o postmenopausal  (LMP ) presenting for annual visit, no complaints today - Pap and HPV NIL, negative 22. Reviewed ASCCP guidelines with patient, advised can stop paps as >65, although given life expectancy assumptions in this recommendation I would consider at least one additional pap, however, not due until [id number] - Hx of ovarian cyst, 2.4 cm and simple in 2019, 1.9 cm with no FDG avidity on PET CT 2022. No symptoms and no masses on exam today, no need to follow further. Ends up getting serial CT scans for MGUS regardless, advised to let us know if cyst is still present or enlarging. - Last colonoscopy in , due in  per pt - Has osteoporosis, follows with endocrinology, DEXA scans up to date - Mammogram from [address] ([facility] radiology) - Follow up for routine visits or as needed  Klever Jamison MD.

## 2024-03-26 ENCOUNTER — RX RENEWAL (OUTPATIENT)
Age: 69
End: 2024-03-26

## 2024-03-26 RX ORDER — AMLODIPINE BESYLATE 10 MG/1
10 TABLET ORAL
Qty: 90 | Refills: 3 | Status: ACTIVE | COMMUNITY
Start: 2024-03-26 | End: 1900-01-01

## 2024-03-27 ENCOUNTER — RX RENEWAL (OUTPATIENT)
Age: 69
End: 2024-03-27

## 2024-03-27 RX ORDER — OLMESARTAN MEDOXOMIL 40 MG/1
40 TABLET, FILM COATED ORAL
Qty: 90 | Refills: 3 | Status: ACTIVE | COMMUNITY
Start: 2022-01-21 | End: 1900-01-01

## 2024-05-09 ENCOUNTER — NON-APPOINTMENT (OUTPATIENT)
Age: 69
End: 2024-05-09

## 2024-05-09 ENCOUNTER — APPOINTMENT (OUTPATIENT)
Dept: CARDIOLOGY | Facility: CLINIC | Age: 69
End: 2024-05-09
Payer: MEDICARE

## 2024-05-09 VITALS
OXYGEN SATURATION: 98 % | DIASTOLIC BLOOD PRESSURE: 74 MMHG | WEIGHT: 170 LBS | BODY MASS INDEX: 30.12 KG/M2 | HEIGHT: 63 IN | HEART RATE: 79 BPM | SYSTOLIC BLOOD PRESSURE: 114 MMHG

## 2024-05-09 DIAGNOSIS — I10 ESSENTIAL (PRIMARY) HYPERTENSION: ICD-10-CM

## 2024-05-09 PROCEDURE — G2211 COMPLEX E/M VISIT ADD ON: CPT

## 2024-05-09 PROCEDURE — 99214 OFFICE O/P EST MOD 30 MIN: CPT

## 2024-05-09 PROCEDURE — 93000 ELECTROCARDIOGRAM COMPLETE: CPT

## 2024-05-09 NOTE — HISTORY OF PRESENT ILLNESS
[FreeTextEntry1] : 70 yo female presents for evaluation for hypertension. . Pt has been trying to exercise. She has gained weight. Pt has been normal  blood pressure . Pt denies chest pain or shortness of breath. She reports leg cramps. She has followed with renal. Labs with PCP

## 2024-06-26 ENCOUNTER — OUTPATIENT (OUTPATIENT)
Dept: OUTPATIENT SERVICES | Facility: HOSPITAL | Age: 69
LOS: 1 days | End: 2024-06-26
Payer: MEDICARE

## 2024-06-26 DIAGNOSIS — Z98.89 OTHER SPECIFIED POSTPROCEDURAL STATES: Chronic | ICD-10-CM

## 2024-06-26 DIAGNOSIS — Z98.890 OTHER SPECIFIED POSTPROCEDURAL STATES: Chronic | ICD-10-CM

## 2024-06-26 DIAGNOSIS — K63.2 FISTULA OF INTESTINE: Chronic | ICD-10-CM

## 2024-06-26 DIAGNOSIS — I10 ESSENTIAL (PRIMARY) HYPERTENSION: ICD-10-CM

## 2024-06-26 DIAGNOSIS — Z90.49 ACQUIRED ABSENCE OF OTHER SPECIFIED PARTS OF DIGESTIVE TRACT: Chronic | ICD-10-CM

## 2024-06-26 DIAGNOSIS — B74.3 LOIASIS: Chronic | ICD-10-CM

## 2024-06-26 PROCEDURE — 93306 TTE W/DOPPLER COMPLETE: CPT | Mod: 26

## 2024-06-26 PROCEDURE — 93306 TTE W/DOPPLER COMPLETE: CPT

## 2024-06-27 DIAGNOSIS — I10 ESSENTIAL (PRIMARY) HYPERTENSION: ICD-10-CM

## 2024-07-23 ENCOUNTER — APPOINTMENT (OUTPATIENT)
Dept: OTOLARYNGOLOGY | Facility: CLINIC | Age: 69
End: 2024-07-23
Payer: MEDICARE

## 2024-07-23 VITALS
DIASTOLIC BLOOD PRESSURE: 69 MMHG | HEART RATE: 83 BPM | HEIGHT: 63 IN | WEIGHT: 170 LBS | SYSTOLIC BLOOD PRESSURE: 110 MMHG | BODY MASS INDEX: 30.12 KG/M2

## 2024-07-23 DIAGNOSIS — E04.2 NONTOXIC MULTINODULAR GOITER: ICD-10-CM

## 2024-07-23 PROCEDURE — 99203 OFFICE O/P NEW LOW 30 MIN: CPT

## 2024-07-23 NOTE — PHYSICAL EXAM
[Midline] : trachea located in midline position [Normal] : no rashes [de-identified] : Nodue in the isthmus.

## 2024-07-23 NOTE — CONSULT LETTER
[Dear  ___] : Dear  [unfilled], [Consult Letter:] : I had the pleasure of evaluating your patient, [unfilled]. [Please see my note below.] : Please see my note below. [Consult Closing:] : Thank you very much for allowing me to participate in the care of this patient.  If you have any questions, please do not hesitate to contact me. [Sincerely,] : Sincerely, [FreeTextEntry2] : Dr Nadiya Almaguer [FreeTextEntry3] :  Paul Mi MD, FACS  Otolaryngology-Head and Neck Surgery [place] [employer] [employer] [facility]

## 2024-07-23 NOTE — PLAN
[TextEntry] : - Bilateral thyroid nodules for a few years. - Had FNA of 3 nodules in 2022 and result was benign. Repeat FNA of a 1.1 cm R lower pole nodule recently was also benign. - Discussed results with the patient and since she has no symptoms and FNAs were benign I recommended continuing with US surveillance. - If develops symptoms or shows signs of growth we may discuss treatment. - Repeat US in on year.

## 2024-07-23 NOTE — HISTORY OF PRESENT ILLNESS
[de-identified] : This is a 69 yro patient referred by Dr. Almaguer for thyroid nodule.  This was found about 2 yrs ago during physical exam/US.  Pt had FNA in 2022 and the nodule grew so she had FNA in 2024.  No dysphagia, dysphonia or dyspnea. No fever, chills, or weight loss.  Reports itching all over the body for the past 3-4 weeks, was prescribed Claritin by PCP which seems to helps  Had COVID recently.  Patient denies h/o radiation and has no family h/o thyroid cancer. Not on any thyroid medications. Pt states thyroid labs have not been checked recently.   s/p FNA 5/27/2022:  A) Right thyroid lower pole 1.1cm nodule: Nodular goiter  (Benign, [place] Cat II) B) Left thyroud lower pole 1.3cm Nodule: Nodular goiter  (Benign, [place] Cat II) C) thyroid isthmus, 1.7cm nodule : Nodular goiter  (Benign, [place] Cat II)  s/p FNA 6/11/2024 ([facility]): thyroid right lower pole nodule: benign follicular nodule  ([place] Cat II)

## 2024-11-14 ENCOUNTER — APPOINTMENT (OUTPATIENT)
Dept: CARDIOLOGY | Facility: CLINIC | Age: 69
End: 2024-11-14
Payer: MEDICARE

## 2024-11-14 ENCOUNTER — NON-APPOINTMENT (OUTPATIENT)
Age: 69
End: 2024-11-14

## 2024-11-14 VITALS
HEART RATE: 74 BPM | OXYGEN SATURATION: 98 % | HEIGHT: 63 IN | WEIGHT: 174 LBS | BODY MASS INDEX: 30.83 KG/M2 | SYSTOLIC BLOOD PRESSURE: 130 MMHG | DIASTOLIC BLOOD PRESSURE: 80 MMHG

## 2024-11-14 VITALS — SYSTOLIC BLOOD PRESSURE: 140 MMHG | DIASTOLIC BLOOD PRESSURE: 80 MMHG

## 2024-11-14 DIAGNOSIS — I10 ESSENTIAL (PRIMARY) HYPERTENSION: ICD-10-CM

## 2024-11-14 PROCEDURE — 93000 ELECTROCARDIOGRAM COMPLETE: CPT

## 2024-11-14 PROCEDURE — G2211 COMPLEX E/M VISIT ADD ON: CPT

## 2024-11-14 PROCEDURE — 99214 OFFICE O/P EST MOD 30 MIN: CPT

## 2024-12-09 ENCOUNTER — APPOINTMENT (OUTPATIENT)
Dept: ORTHOPEDIC SURGERY | Facility: CLINIC | Age: 69
End: 2024-12-09
Payer: MEDICARE

## 2024-12-09 VITALS — BODY MASS INDEX: 30.83 KG/M2 | WEIGHT: 174 LBS | HEIGHT: 63 IN

## 2024-12-09 DIAGNOSIS — M43.16 SPONDYLOLISTHESIS, LUMBAR REGION: ICD-10-CM

## 2024-12-09 DIAGNOSIS — S22.000A WEDGE COMPRESSION FRACTURE OF UNSPECIFIED THORACIC VERTEBRA, INITIAL ENCOUNTER FOR CLOSED FRACTURE: ICD-10-CM

## 2024-12-09 DIAGNOSIS — M47.816 SPONDYLOSIS W/OUT MYELOPATHY OR RADICULOPATHY, LUMBAR REGION: ICD-10-CM

## 2024-12-09 DIAGNOSIS — M54.9 DORSALGIA, UNSPECIFIED: ICD-10-CM

## 2024-12-09 DIAGNOSIS — I10 ESSENTIAL (PRIMARY) HYPERTENSION: ICD-10-CM

## 2024-12-09 DIAGNOSIS — M48.062 SPINAL STENOSIS, LUMBAR REGION WITH NEUROGENIC CLAUDICATION: ICD-10-CM

## 2024-12-09 PROCEDURE — 99203 OFFICE O/P NEW LOW 30 MIN: CPT

## 2024-12-09 PROCEDURE — 72100 X-RAY EXAM L-S SPINE 2/3 VWS: CPT

## 2024-12-09 PROCEDURE — 72070 X-RAY EXAM THORAC SPINE 2VWS: CPT

## 2024-12-09 RX ORDER — GABAPENTIN 300 MG/1
300 CAPSULE ORAL
Qty: 90 | Refills: 2 | Status: ACTIVE | COMMUNITY
Start: 2024-12-09 | End: 1900-01-01

## 2024-12-17 ENCOUNTER — RESULT REVIEW (OUTPATIENT)
Age: 69
End: 2024-12-17

## 2024-12-19 NOTE — DISCHARGE NOTE NURSING/CASE MANAGEMENT/SOCIAL WORK - NSDCPEPTSTROKESIGNS_GEN_ALL_CORE
Refill approved as requested.  
Sudden numbness or weakness of the face, arm, or leg, especially on one side of the body. Confusion, trouble speaking or understanding. Trouble seeing in one or both eyes. Trouble walking, dizziness, loss of balance or coordination. Severe headache.

## 2025-01-06 ENCOUNTER — APPOINTMENT (OUTPATIENT)
Dept: OBGYN | Facility: CLINIC | Age: 70
End: 2025-01-06

## 2025-01-10 ENCOUNTER — APPOINTMENT (OUTPATIENT)
Dept: ORTHOPEDIC SURGERY | Facility: CLINIC | Age: 70
End: 2025-01-10
Payer: MEDICARE

## 2025-01-10 VITALS — HEIGHT: 63 IN | BODY MASS INDEX: 30.83 KG/M2 | WEIGHT: 174 LBS

## 2025-01-10 DIAGNOSIS — M48.062 SPINAL STENOSIS, LUMBAR REGION WITH NEUROGENIC CLAUDICATION: ICD-10-CM

## 2025-01-10 DIAGNOSIS — M43.16 SPONDYLOLISTHESIS, LUMBAR REGION: ICD-10-CM

## 2025-01-10 PROCEDURE — 99214 OFFICE O/P EST MOD 30 MIN: CPT

## 2025-02-03 ENCOUNTER — APPOINTMENT (OUTPATIENT)
Dept: PAIN MANAGEMENT | Facility: CLINIC | Age: 70
End: 2025-02-03

## 2025-03-17 ENCOUNTER — APPOINTMENT (OUTPATIENT)
Dept: OBGYN | Facility: CLINIC | Age: 70
End: 2025-03-17

## 2025-03-17 VITALS
BODY MASS INDEX: 29.23 KG/M2 | SYSTOLIC BLOOD PRESSURE: 118 MMHG | HEIGHT: 63 IN | DIASTOLIC BLOOD PRESSURE: 82 MMHG | WEIGHT: 165 LBS

## 2025-03-17 DIAGNOSIS — M81.0 AGE-RELATED OSTEOPOROSIS W/OUT CURRENT PATHOLOGICAL FRACTURE: ICD-10-CM

## 2025-03-17 DIAGNOSIS — N95.2 POSTMENOPAUSAL ATROPHIC VAGINITIS: ICD-10-CM

## 2025-03-17 DIAGNOSIS — E04.2 NONTOXIC MULTINODULAR GOITER: ICD-10-CM

## 2025-03-17 DIAGNOSIS — Z12.12 ENCOUNTER FOR SCREENING FOR MALIGNANT NEOPLASM OF RECTUM: ICD-10-CM

## 2025-03-17 DIAGNOSIS — I10 ESSENTIAL (PRIMARY) HYPERTENSION: ICD-10-CM

## 2025-03-17 DIAGNOSIS — Z12.4 ENCOUNTER FOR SCREENING FOR MALIGNANT NEOPLASM OF CERVIX: ICD-10-CM

## 2025-03-17 DIAGNOSIS — Z01.419 ENCOUNTER FOR GYNECOLOGICAL EXAMINATION (GENERAL) (ROUTINE) W/OUT ABNORMAL FINDINGS: ICD-10-CM

## 2025-03-17 DIAGNOSIS — Z12.39 ENCOUNTER FOR OTHER SCREENING FOR MALIGNANT NEOPLASM OF BREAST: ICD-10-CM

## 2025-03-17 DIAGNOSIS — N39.0 URINARY TRACT INFECTION, SITE NOT SPECIFIED: ICD-10-CM

## 2025-03-17 LAB
DATE COLLECTED: NORMAL
HEMOCCULT SP1 STL QL: NEGATIVE
QUALITY CONTROL: YES

## 2025-03-17 PROCEDURE — G0101: CPT

## 2025-03-17 PROCEDURE — 99397 PER PM REEVAL EST PAT 65+ YR: CPT | Mod: GY

## 2025-03-17 RX ORDER — ESTRADIOL 0.1 MG/G
0.1 CREAM VAGINAL
Qty: 2 | Refills: 2 | Status: ACTIVE | COMMUNITY
Start: 2025-03-17 | End: 1900-01-01

## 2025-03-17 RX ORDER — ZOLPIDEM TARTRATE 5 MG/1
TABLET ORAL
Refills: 0 | Status: ACTIVE | COMMUNITY

## 2025-03-18 LAB — HPV HIGH+LOW RISK DNA PNL CVX: NOT DETECTED

## 2025-03-21 LAB — CYTOLOGY CVX/VAG DOC THIN PREP: NORMAL

## 2025-04-11 ENCOUNTER — APPOINTMENT (OUTPATIENT)
Dept: ORTHOPEDIC SURGERY | Facility: CLINIC | Age: 70
End: 2025-04-11

## 2025-04-11 VITALS — BODY MASS INDEX: 29.23 KG/M2 | WEIGHT: 165 LBS | HEIGHT: 63 IN

## 2025-04-11 DIAGNOSIS — M43.16 SPONDYLOLISTHESIS, LUMBAR REGION: ICD-10-CM

## 2025-04-11 DIAGNOSIS — M48.062 SPINAL STENOSIS, LUMBAR REGION WITH NEUROGENIC CLAUDICATION: ICD-10-CM

## 2025-04-11 PROCEDURE — 99213 OFFICE O/P EST LOW 20 MIN: CPT

## 2025-06-11 ENCOUNTER — APPOINTMENT (OUTPATIENT)
Dept: CARDIOLOGY | Facility: CLINIC | Age: 70
End: 2025-06-11
Payer: MEDICARE

## 2025-06-11 VITALS
HEIGHT: 63 IN | OXYGEN SATURATION: 97 % | HEART RATE: 80 BPM | SYSTOLIC BLOOD PRESSURE: 118 MMHG | WEIGHT: 170 LBS | DIASTOLIC BLOOD PRESSURE: 66 MMHG | BODY MASS INDEX: 30.12 KG/M2

## 2025-06-11 PROCEDURE — 99214 OFFICE O/P EST MOD 30 MIN: CPT

## 2025-06-11 PROCEDURE — G2211 COMPLEX E/M VISIT ADD ON: CPT

## 2025-06-11 PROCEDURE — 93000 ELECTROCARDIOGRAM COMPLETE: CPT

## 2025-06-11 RX ORDER — ZOLPIDEM TARTRATE 10 MG/1
10 TABLET ORAL
Refills: 0 | Status: ACTIVE | COMMUNITY